# Patient Record
Sex: MALE | Race: BLACK OR AFRICAN AMERICAN | NOT HISPANIC OR LATINO | Employment: OTHER | URBAN - METROPOLITAN AREA
[De-identification: names, ages, dates, MRNs, and addresses within clinical notes are randomized per-mention and may not be internally consistent; named-entity substitution may affect disease eponyms.]

---

## 2017-01-26 LAB
EXT ALBUMIN: 4.5
EXT ALKALINE PHOSPHATASE: 83
EXT ALT: 13
EXT AST: 19
EXT BILIRUBIN TOTAL: 0.9
EXT BUN: 12
EXT CALCIUM: 9.4
EXT CHLORIDE: 101
EXT CHOLESTEROL: 257
EXT CO2: 28
EXT CREATININE: 1.33 MG/DL
EXT EOSINOPHIL%: 3
EXT GGT: 10
EXT GLUCOSE: 94
EXT HDL: 46
EXT HEMATOCRIT: 41
EXT HEMOGLOBIN: 13.9
EXT LDL CHOLESTEROL: 195.4
EXT LYMPH%: 22.8
EXT MONOCYTES%: 8
EXT PLATELETS: 169
EXT POTASSIUM: 4.2
EXT PROTEIN TOTAL: 7.9
EXT SEGS%: 65.2
EXT SODIUM: 143 MMOL/L
EXT TACROLIMUS LVL: 6.2
EXT WBC: 4.99

## 2017-01-27 ENCOUNTER — TELEPHONE (OUTPATIENT)
Dept: TRANSPLANT | Facility: CLINIC | Age: 57
End: 2017-01-27

## 2017-01-27 NOTE — TELEPHONE ENCOUNTER
----- Message from Bisi Avila MD sent at 1/26/2017  4:33 PM CST -----  The Labs are stable except lipids- to see pcp - please let patient know.

## 2017-01-27 NOTE — TELEPHONE ENCOUNTER
Labs stable  Left message advised to f/u with pcp for elevated lipids. Will repeat per protocol. Letter sent

## 2017-01-31 ENCOUNTER — TELEPHONE (OUTPATIENT)
Dept: TRANSPLANT | Facility: CLINIC | Age: 57
End: 2017-01-31

## 2017-01-31 NOTE — TELEPHONE ENCOUNTER
Please advise i have called no answer left VM stating appt had to be cancelled to due  Will schedule another appt for pt to see someone

## 2017-02-03 ENCOUNTER — TELEPHONE (OUTPATIENT)
Dept: TRANSPLANT | Facility: CLINIC | Age: 57
End: 2017-02-03

## 2017-02-03 NOTE — TELEPHONE ENCOUNTER
----- Message from Quinn Brooks sent at 2/3/2017  3:17 PM CST -----  Contact: pt   ..Neto Mixon is returning call to schedule, please call

## 2017-03-06 ENCOUNTER — OFFICE VISIT (OUTPATIENT)
Dept: TRANSPLANT | Facility: CLINIC | Age: 57
End: 2017-03-06
Payer: COMMERCIAL

## 2017-03-06 VITALS
RESPIRATION RATE: 18 BRPM | BODY MASS INDEX: 31.14 KG/M2 | WEIGHT: 255.75 LBS | DIASTOLIC BLOOD PRESSURE: 86 MMHG | OXYGEN SATURATION: 100 % | SYSTOLIC BLOOD PRESSURE: 130 MMHG | TEMPERATURE: 99 F | HEART RATE: 67 BPM | HEIGHT: 76 IN

## 2017-03-06 DIAGNOSIS — N18.30 CKD (CHRONIC KIDNEY DISEASE) STAGE 3, GFR 30-59 ML/MIN: ICD-10-CM

## 2017-03-06 DIAGNOSIS — Z29.89 PROPHYLACTIC IMMUNOTHERAPY: ICD-10-CM

## 2017-03-06 DIAGNOSIS — Z94.4 LIVER TRANSPLANTED: ICD-10-CM

## 2017-03-06 DIAGNOSIS — Z94.4 S/P LIVER TRANSPLANT: Primary | ICD-10-CM

## 2017-03-06 DIAGNOSIS — Z94.4 LIVER REPLACED BY TRANSPLANT: ICD-10-CM

## 2017-03-06 DIAGNOSIS — M85.80 OSTEOPENIA: ICD-10-CM

## 2017-03-06 PROCEDURE — 1160F RVW MEDS BY RX/DR IN RCRD: CPT | Mod: S$GLB,,, | Performed by: INTERNAL MEDICINE

## 2017-03-06 PROCEDURE — 99999 PR PBB SHADOW E&M-EST. PATIENT-LVL IV: CPT | Mod: PBBFAC,,, | Performed by: INTERNAL MEDICINE

## 2017-03-06 PROCEDURE — 3079F DIAST BP 80-89 MM HG: CPT | Mod: S$GLB,,, | Performed by: INTERNAL MEDICINE

## 2017-03-06 PROCEDURE — 3075F SYST BP GE 130 - 139MM HG: CPT | Mod: S$GLB,,, | Performed by: INTERNAL MEDICINE

## 2017-03-06 PROCEDURE — 99215 OFFICE O/P EST HI 40 MIN: CPT | Mod: S$GLB,,, | Performed by: INTERNAL MEDICINE

## 2017-03-06 RX ORDER — MYCOPHENOLATE MOFETIL 500 MG/1
500 TABLET ORAL 2 TIMES DAILY
Qty: 60 TABLET | Refills: 11 | Status: SHIPPED | OUTPATIENT
Start: 2017-03-06 | End: 2018-01-27 | Stop reason: SDUPTHER

## 2017-03-06 RX ORDER — TACROLIMUS 1 MG/1
4 CAPSULE ORAL EVERY 12 HOURS
Qty: 990 CAPSULE | Refills: 3 | Status: SHIPPED | OUTPATIENT
Start: 2017-03-06 | End: 2018-03-12 | Stop reason: SDUPTHER

## 2017-03-06 NOTE — LETTER
March 7, 2017                     [facility]  [address]  [facility] [address]  Phone: [phone]   Patient: Neto Mixon   MR Number: [id number]   YOB: 1960   Date of Visit: 3/6/2017       Dear      Thank you for referring Neto Mixon to me for evaluation. Attached you will find relevant portions of my assessment and plan of care.    If you have questions, please do not hesitate to call me. I look forward to following Neto Mixon along with you.    Sincerely,    Bisi Avila MD    Enclosure    If you would like to receive this communication electronically, please contact [email or web address] or [phone] to request [employer] access.    [employer] is a tool which provides read-only access to select patient information with whom you have a relationship. Its easy to use and provides real time access to review your patients record including encounter summaries, notes, results, and demographic information.    If you feel you have received this communication in error or would no longer like to receive these types of communications, please e-mail [email or web address]

## 2017-03-06 NOTE — PROGRESS NOTES
Transplant Hepatology  Liver Transplant Recipient Follow-up    Transplant Date: 2011  UNOS Native Liver Dx: Alcoholic Cirrhosis    Neto is here for follow up of his liver transplant.    ORGAN: LIVER  Whole or Partial: whole liver  Donor Type:  - brain death  CDC High Risk: no  Donor CMV Status: negative  Donor HCV Status: negative  Donor HBcAb: negative  Biliary Anastomosis:  Arterial Anatomy:  IVC reconstruction:  Portal vein status:    He has had the following complications since transplant: none.     Subjective:     Interval History: Neto was last seen in . He is now 5 years post liver transplant. Currently, he is doing well. Current complaints include none. Current complaints include none.    He is on Prograf monotherapy, latest level pending. He has excellent allograft function (ALT 13, AST 19). CKD, cr 1.3-1.4.    He denies any altered mental status; he has no jaundice and remainder of GI or liver-related symptoms are essentially negative. Works full time.     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 He has therefore stopped ASA and will have a repeat colonoscopy this year.    Bone density in  that showed some osteopenia. He needs to see a dermatologist annually to screen for skin cancer.    Review of Systems   Constitutional: Negative.    HENT: Negative.    Eyes: Negative.    Respiratory: Negative.    Cardiovascular: Negative.    Gastrointestinal: Negative.    Genitourinary: Negative.    Musculoskeletal: Negative.    Skin: Negative.    Neurological: Negative.    Psychiatric/Behavioral: Negative.        Objective:     Physical Exam   Constitutional: He is oriented to person, place, and time. He appears well-developed and well-nourished.   HENT:   Head: Normocephalic and atraumatic.   Eyes: Conjunctivae and EOM are normal. Pupils are equal, round, and reactive to  light. No scleral icterus.   Neck: Normal range of motion. Neck supple. No thyromegaly present.   Cardiovascular: Normal rate, regular rhythm and normal heart sounds.    Pulmonary/Chest: Effort normal and breath sounds normal. He has no rales.   Abdominal: Soft. Bowel sounds are normal. He exhibits no distension and no mass. There is no tenderness.   Musculoskeletal: Normal range of motion. He exhibits no edema.   Neurological: He is alert and oriented to person, place, and time.   Skin: Skin is warm and dry. No rash noted.   Psychiatric: He has a normal mood and affect.   Vitals reviewed.    Lab Results   Component Value Date    BILITOT 1.3 (H) 10/01/2012    AST 15 10/01/2012    ALT 11 10/01/2012    ALKPHOS 136 (H) 10/01/2012    CREATININE 1.0 10/01/2012    CREATININE 0.9 10/01/2012    ALBUMIN 3.9 10/01/2012    ALBUMIN 3.9 10/01/2012     Lab Results   Component Value Date    WBC 6.07 10/01/2012    HGB 13.3 (L) 10/01/2012    HCT 39.5 (L) 10/01/2012     10/01/2012     Lab Results   Component Value Date    TACROLIMUS 6.4 10/01/2012       Assessment/Plan:   1. Status post orthotopic liver transplantation for Laennec's cirrhosis. Current recommendations:    1. S/p liver transplant: Excellent liver function. On Prograf monotherapy. Since he has some mild CKD I recommend lowering prograf target to 3-4. I have asked him to lower his prograf dose to 4/4 from 5/5 and do monthly labs x6 to monitor for rejection. I have also recommended that cellcept be restarted to allow us to run the prograf low. The patient has expressed concern that he may not be able to afford adding a new medication. I have asked him not to lower his prograf until he is sure he can obtain the cellcept long-term.  3. Colorectal cancer screening: agree with repeat colonoscopy this year to follow up on ulcerated IC valve noted on last colonoscopy in 2016. May remain off ASA.  4. Post-transplant health maintenance: pt to see dermatologist annually to  screen for skin cancer.  5. R/O osteoporosis, hx of osteopenia in 2012: I am recommending a bone density to r/o osteoporosis this year.    RTC in one year.     Bisi Avila MD           [facility] Patient Status  Functional Status: 100% - Normal, no complaints, no evidence of disease  Physical Capacity: No Limitations

## 2017-03-06 NOTE — MR AVS SNAPSHOT
[facility]  [address]  [facility] [address]  Phone: [phone]                  Neto Mixon   3/6/2017 8:30 AM   Office Visit    Description:  Male : 1960   Provider:  Bisi Avila MD   Department:  [facility]           Reason for Visit     Liver Transplant Follow-up           Diagnoses this Visit        Comments    S/P liver transplant    -  Primary     Liver replaced by transplant                To Do List           Goals (5 Years of Data)     None       These Medications        Disp Refills Start End    tacrolimus (PROGRAF) 1 MG Cap 990 capsule 3 3/6/2017     Take 4 capsules (4 mg total) by mouth every 12 (twelve) hours. - Oral    Pharmacy: [facility] - [address] Ph #: [phone]       mycophenolate (CELLCEPT) 500 mg Tab 60 tablet 11 3/6/2017 2017    Take 1 tablet (500 mg total) by mouth 2 (two) times daily. - Oral    Pharmacy: [facility] - [address] Ph #: [phone]         [facility]     [facility] Care [facility] -  Assistance  Registered nurses in the [facility] provide clinical advisement, health education, appointment booking, and other advisory services.  Call for this free service at [phone].             Medications           Message regarding Medications     Verify the changes and/or additions to your medication regime listed below are the same as discussed with your clinician today.  If any of these changes or additions are incorrect, please notify your healthcare provider.        START taking these NEW medications        Refills    mycophenolate (CELLCEPT) 500 mg Tab 11    Sig: Take 1 tablet (500 mg total) by mouth 2 (two) times daily.    Class: Normal    Route: Oral      CHANGE how you are taking these medications     Start Taking Instead of    tacrolimus (PROGRAF) 1 MG Cap tacrolimus  "(PROGRAF) 1 MG Cap    Dosage:  Take 4 capsules (4 mg total) by mouth every 12 (twelve) hours. Dosage:  Take 5 capsules (5 mg total) by mouth every 12 (twelve) hours.    Reason for Change:  Reorder       STOP taking these medications     aspirin (ECOTRIN) 81 MG EC tablet Take 81 mg by mouth once daily.             Verify that the below list of medications is an accurate representation of the medications you are currently taking.  If none reported, the list may be blank. If incorrect, please contact your healthcare provider. Carry this list with you in case of emergency.           Current Medications     multivitamin capsule Take 1 capsule by mouth once daily.      pantoprazole (PROTONIX) 40 MG tablet Take 40 mg by mouth once daily.      sertraline (ZOLOFT) 100 MG tablet Take 100 mg by mouth once daily.      tacrolimus (PROGRAF) 1 MG Cap Take 4 capsules (4 mg total) by mouth every 12 (twelve) hours.    fish oil-omega-3 fatty acids 300-1,000 mg capsule Take 2 g by mouth 2 (two) times daily.    mycophenolate (CELLCEPT) 500 mg Tab Take 1 tablet (500 mg total) by mouth 2 (two) times daily.           Clinical Reference Information           Your Vitals Were     BP Pulse Temp Resp Height Weight    130/86 (BP Location: Right arm, Patient Position: Sitting, BP Method: Automatic) 67 99 °F (37.2 °C) (Oral) 18 6' 4" (1.93 m) 116 kg (255 lb 11.7 oz)    SpO2 BMI             100% 31.13 kg/m2         Blood Pressure          Most Recent Value    BP  130/86      Allergies as of 3/6/2017     No Known Allergies      Immunizations Administered on Date of Encounter - 3/6/2017     None      Orders Placed During Today's Visit     Future Labs/Procedures Expected by Expires    DXA Bone Density Spine And Hip  3/6/2017 3/6/2018      Maintenance Dialysis History     Patient has no recorded history of maintenance dialysis.      Transplant Information        Txp Date Organ Coordinator Care Team    7/12/2011 Liver Rosa Bashir RN Surgeon:  Stewart" KAYE Pak MD   Assisting Surgeon:  Tj Archibald Jr., MD   Current Hepatologist:  Bisi Avila MD         [facility] Sign-Up     Activating your [facility] account is as easy as 1-2-3!     1) Visit [email or web address], select Sign Up Now, enter this activation code and your date of birth, then select Next.  [email or web address]  Expires: 4/20/2017  8:57 AM      2) Create a username and password to use when you visit [facility] in the future and select a security question in case you lose your password and select Next.    3) Enter your e-mail address and click Sign Up!    Additional Information  If you have questions, please e-mail [email or web address] or call [phone] to talk to our [facility] staff. Remember, [facility] is NOT to be used for urgent needs. For medical emergencies, dial 911.         Instructions    1. Lower the prograf to minimize the toxicity to the kidneys; new target trough of 3-4. Decrease prograf to 4 mg twice daily  2. Add back cellcept at 500 mg bid. Check labs monthly x 3  3. Repeat colonoscopy this year to follow up on ulcer seen on IC valve  4. Bone density  5. Dermatology evaluation annually to screen for skin cancer       Language Assistance Services     ATTENTION: Language assistance services are available, free of charge. Please call [phone].      ATENCIÓN: Si habla español, tiene a greco disposición servicios gratuitos de asistencia lingüística. Llame al [phone].     CHÚ Ý: N?u b?n nói Ti?ng Vi?t, có các d?ch v? h? tr? ngôn ng? mi?n phí dành cho b?n. G?i s? [phone].         [facility] complies with applicable Federal civil rights laws and does not discriminate on the basis of race, color, national origin, age, disability, or sex.

## 2017-03-06 NOTE — PATIENT INSTRUCTIONS
1. Lower the prograf to minimize the toxicity to the kidneys; new target trough of 3-4. Decrease prograf to 4 mg twice daily  2. Add back cellcept at 500 mg bid. Check labs monthly x 3  3. Repeat colonoscopy this year to follow up on ulcer seen on IC valve  4. Bone density  5. Dermatology evaluation annually to screen for skin cancer

## 2017-03-07 PROBLEM — M85.80 OSTEOPENIA: Status: ACTIVE | Noted: 2017-03-07

## 2017-03-07 PROBLEM — Z29.89 PROPHYLACTIC IMMUNOTHERAPY: Status: ACTIVE | Noted: 2017-03-07

## 2017-03-15 ENCOUNTER — TELEPHONE (OUTPATIENT)
Dept: TRANSPLANT | Facility: CLINIC | Age: 57
End: 2017-03-15

## 2017-03-15 NOTE — TELEPHONE ENCOUNTER
----- Message from Rosa Bashir RN sent at 3/7/2017 12:37 PM CST -----      ----- Message -----     From: Bisi Avila MD     Sent: 3/7/2017   7:36 AM       To: [facility]-Liver Transplant Clinical    Note changes in IS

## 2017-04-10 LAB
EXT ALBUMIN: 4.2
EXT ALKALINE PHOSPHATASE: 73
EXT ALT: 9
EXT AST: 18
EXT BASOPHIL%: 0.8
EXT BILIRUBIN TOTAL: 0.9
EXT BUN: 12
EXT CALCIUM: 9.1
EXT CHLORIDE: 101
EXT CHOLESTEROL: 232
EXT CO2: 26
EXT CREATININE: 1.34 MG/DL
EXT EOSINOPHIL%: 2.8
EXT GGT: 9
EXT GLUCOSE: 93
EXT HDL: 45
EXT HEMATOCRIT: 41.1
EXT HEMOGLOBIN: 13.6
EXT LDL CHOLESTEROL: 172.4
EXT LYMPH%: 24.8
EXT MONOCYTES%: 8.6
EXT PLATELETS: 166
EXT POTASSIUM: 4.2
EXT PROTEIN TOTAL: 7.4
EXT SEGS%: 62.6
EXT SODIUM: 141 MMOL/L
EXT TACROLIMUS LVL: 5.5
EXT TRIGLYCERIDES: 73
EXT WBC: 5.32

## 2017-04-13 ENCOUNTER — TELEPHONE (OUTPATIENT)
Dept: TRANSPLANT | Facility: CLINIC | Age: 57
End: 2017-04-13

## 2017-04-13 NOTE — TELEPHONE ENCOUNTER
----- Message from Bisi Avila MD sent at 4/13/2017  6:25 AM CDT -----  The Labs are stable - please let patient know.

## 2017-07-14 LAB
EXT ALBUMIN: 4
EXT ALKALINE PHOSPHATASE: 66
EXT ALT: 9
EXT AST: 15
EXT BASOPHIL%: 0.4
EXT BILIRUBIN TOTAL: 0.8
EXT BUN: 14
EXT CALCIUM: 9.2
EXT CHLORIDE: 105
EXT CHOLESTEROL: 214
EXT CO2: 27
EXT CREATININE: 1.43 MG/DL
EXT EOSINOPHIL%: 2.2
EXT GFR MDRD AF AMER: 51.16
EXT GGT: 9
EXT GLUCOSE: 96
EXT HDL: 41
EXT HEMATOCRIT: 39.9
EXT HEMOGLOBIN: 13.6
EXT LDL CHOLESTEROL: 160.4
EXT LYMPH%: 19.4
EXT MONOCYTES%: 8.2
EXT PLATELETS: 158
EXT POTASSIUM: 4.5
EXT PROTEIN TOTAL: 7
EXT SEGS%: 69.6
EXT SODIUM: 143 MMOL/L
EXT TACROLIMUS LVL: 6.5
EXT TRIGLYCERIDES: 63
EXT WBC: 4.65

## 2017-07-18 ENCOUNTER — TELEPHONE (OUTPATIENT)
Dept: TRANSPLANT | Facility: CLINIC | Age: 57
End: 2017-07-18

## 2017-07-18 NOTE — TELEPHONE ENCOUNTER
----- Message from Bisi Avila MD sent at 7/16/2017  2:59 PM CDT -----  The Labs are stable - please let patient know.

## 2017-10-27 LAB
EXT ALBUMIN: 4.2
EXT ALKALINE PHOSPHATASE: 75
EXT ALT: 8
EXT AST: 15
EXT BASOPHIL%: 1.2
EXT BILIRUBIN TOTAL: 1
EXT BUN: 14
EXT CALCIUM: 9.1
EXT CHLORIDE: 103
EXT CHOLESTEROL: 212
EXT CO2: 28
EXT CREATININE: 1.41 MG/DL
EXT EOSINOPHIL%: 2.7
EXT GFR MDRD AF AMER: 51.99
EXT GGT: 8
EXT GLUCOSE: 92
EXT HEMATOCRIT: 40.9
EXT HEMOGLOBIN: 13.7
EXT LYMPH%: 20.8
EXT MONOCYTES%: 8.7
EXT PLATELETS: 153
EXT POTASSIUM: 4.6
EXT PROTEIN TOTAL: 7.3
EXT SEGS%: 66.4
EXT SODIUM: 144 MMOL/L
EXT TACROLIMUS LVL: 5.3
EXT TRIGLYCERIDES: 65
EXT WBC: 4.85

## 2017-10-30 ENCOUNTER — TELEPHONE (OUTPATIENT)
Dept: TRANSPLANT | Facility: CLINIC | Age: 57
End: 2017-10-30

## 2017-10-30 NOTE — TELEPHONE ENCOUNTER
----- Message from Bisi Avila MD sent at 10/27/2017  5:55 PM CDT -----  The Labs are stable - please let patient know.

## 2018-01-27 DIAGNOSIS — Z94.4 S/P LIVER TRANSPLANT: ICD-10-CM

## 2018-01-28 RX ORDER — MYCOPHENOLATE MOFETIL 500 MG/1
TABLET ORAL
Qty: 60 TABLET | Refills: 11 | Status: SHIPPED | OUTPATIENT
Start: 2018-01-28 | End: 2018-10-02 | Stop reason: SDUPTHER

## 2018-02-05 ENCOUNTER — TELEPHONE (OUTPATIENT)
Dept: TRANSPLANT | Facility: CLINIC | Age: 58
End: 2018-02-05

## 2018-02-05 NOTE — TELEPHONE ENCOUNTER
----- Message from Soheila Sen sent at 2/5/2018  3:34 PM CST -----  Contact: Self  Mr. Mixon called to speak with Rosa, if possible, regarding a Prior [facility] Rx for mycophenolate (CELLCEPT) 500 mg Tab.    He also wanted to discuss his lab results from today.    Please advise.    Mr. Mixon can be reached at [phone]) regarding this message.    Thanks.

## 2018-02-09 LAB
EXT ALBUMIN: 4.1
EXT ALKALINE PHOSPHATASE: 73
EXT ALT: 10
EXT AST: 18
EXT BASOPHIL%: 0.8
EXT BILIRUBIN TOTAL: 0.9
EXT BUN: 13
EXT CALCIUM: 9
EXT CHLORIDE: 104
EXT CO2: 29
EXT CREATININE: 1.33 MG/DL
EXT EOSINOPHIL%: 2.9
EXT GFR MDRD AF AMER: 55.62
EXT GGT: 9
EXT GLUCOSE: 92
EXT HEMATOCRIT: 42.5
EXT HEMOGLOBIN: 13.9
EXT LYMPH%: 21
EXT MONOCYTES%: 8
EXT PLATELETS: 178
EXT POTASSIUM: 4.6
EXT PROTEIN TOTAL: 7.4
EXT SEGS%: 67.1
EXT SODIUM: 143 MMOL/L
EXT TACROLIMUS LVL: 5.1
EXT WBC: 4.9

## 2018-02-15 ENCOUNTER — TELEPHONE (OUTPATIENT)
Dept: TRANSPLANT | Facility: CLINIC | Age: 58
End: 2018-02-15

## 2018-02-15 NOTE — TELEPHONE ENCOUNTER
----- Message from Bisi Avila MD sent at 2/9/2018  9:01 PM CST -----  The Labs are stable - please let patient know.

## 2018-02-20 ENCOUNTER — TELEPHONE (OUTPATIENT)
Dept: TRANSPLANT | Facility: CLINIC | Age: 58
End: 2018-02-20

## 2018-02-20 DIAGNOSIS — Z94.4 STATUS POST LIVER TRANSPLANT: Primary | ICD-10-CM

## 2018-02-20 NOTE — TELEPHONE ENCOUNTER
----- Message from Va Bryan sent at 2/20/2018  3:54 PM CST -----  Contact: pt  Pt calling to let coordinator know that he did have bone density test done already.    Pt contact number [phone]

## 2018-03-12 ENCOUNTER — HOSPITAL ENCOUNTER (OUTPATIENT)
Dept: RADIOLOGY | Facility: HOSPITAL | Age: 58
Discharge: HOME | End: 2018-03-12
Attending: INTERNAL MEDICINE
Payer: COMMERCIAL

## 2018-03-12 ENCOUNTER — OFFICE VISIT (OUTPATIENT)
Dept: TRANSPLANT | Facility: CLINIC | Age: 58
End: 2018-03-12
Payer: COMMERCIAL

## 2018-03-12 VITALS
HEART RATE: 66 BPM | SYSTOLIC BLOOD PRESSURE: 135 MMHG | WEIGHT: 255.31 LBS | BODY MASS INDEX: 31.09 KG/M2 | TEMPERATURE: 99 F | OXYGEN SATURATION: 100 % | HEIGHT: 76 IN | DIASTOLIC BLOOD PRESSURE: 89 MMHG | RESPIRATION RATE: 16 BRPM

## 2018-03-12 DIAGNOSIS — Z94.4 LIVER REPLACED BY TRANSPLANT: ICD-10-CM

## 2018-03-12 DIAGNOSIS — Z29.89 PROPHYLACTIC IMMUNOTHERAPY: Primary | ICD-10-CM

## 2018-03-12 DIAGNOSIS — Z94.4 STATUS POST LIVER TRANSPLANT: ICD-10-CM

## 2018-03-12 DIAGNOSIS — Z94.4 LIVER TRANSPLANTED: ICD-10-CM

## 2018-03-12 PROCEDURE — 3075F SYST BP GE 130 - 139MM HG: CPT | Mod: CPTII,S$GLB,, | Performed by: INTERNAL MEDICINE

## 2018-03-12 PROCEDURE — 76705 ECHO EXAM OF ABDOMEN: CPT | Mod: 26,59,, | Performed by: INTERNAL MEDICINE

## 2018-03-12 PROCEDURE — 93975 VASCULAR STUDY: CPT | Mod: 26,,, | Performed by: INTERNAL MEDICINE

## 2018-03-12 PROCEDURE — 3079F DIAST BP 80-89 MM HG: CPT | Mod: CPTII,S$GLB,, | Performed by: INTERNAL MEDICINE

## 2018-03-12 PROCEDURE — 99999 PR PBB SHADOW E&M-EST. PATIENT-LVL IV: CPT | Mod: PBBFAC,,, | Performed by: INTERNAL MEDICINE

## 2018-03-12 PROCEDURE — 99215 OFFICE O/P EST HI 40 MIN: CPT | Mod: S$GLB,,, | Performed by: INTERNAL MEDICINE

## 2018-03-12 PROCEDURE — 93975 VASCULAR STUDY: CPT | Mod: TC

## 2018-03-12 RX ORDER — TACROLIMUS 1 MG/1
CAPSULE ORAL
Qty: 990 CAPSULE | Refills: 3 | Status: SHIPPED | OUTPATIENT
Start: 2018-03-12 | End: 2018-03-19 | Stop reason: SDUPTHER

## 2018-03-12 NOTE — PROGRESS NOTES
Transplant Hepatology  Liver Transplant Recipient Follow-up    Transplant Date: 2011  UNOS Native Liver Dx: Alcoholic Cirrhosis    Neto is here for follow up of his liver transplant.    ORGAN: LIVER  Whole or Partial: whole liver  Donor Type:  - brain death  CDC High Risk: no  Donor CMV Status: negative  Donor HCV Status: negative  Donor HBcAb: negative  Biliary Anastomosis:  Arterial Anatomy:  IVC reconstruction:  Portal vein status:    He has had the following complications since transplant: none.     Subjective:     Interval History: Neto was last seen in . He is now 6 years post liver transplant. Currently, he is doing well. Current complaints include none.     He is on Prograf monotherapy, latest level pending. He has excellent allograft function (ALT 10, AST 18). CKD, cr 1.3-1.4. PG level 5.1.    He denies any altered mental status; he has no jaundice and remainder of GI or liver-related symptoms are essentially negative. Works full time.     He did have a colonoscopy in 2017 that did not reveal any polyps.  Bone density in  that showed some osteopenia-repeat now. He needs to see a dermatologist annually to screen for skin cancer.    US doppler ok today.     Review of Systems   Constitutional: Negative.    HENT: Negative.    Eyes: Negative.    Respiratory: Negative.    Cardiovascular: Negative.    Gastrointestinal: Negative.    Genitourinary: Negative.    Musculoskeletal: Negative.    Skin: Negative.    Neurological: Negative.    Psychiatric/Behavioral: Negative.        Objective:     Physical Exam   Constitutional: He is oriented to person, place, and time. He appears well-developed and well-nourished.   HENT:   Head: Normocephalic and atraumatic.   Eyes: Conjunctivae and EOM are normal. Pupils are equal, round, and reactive to light. No scleral icterus.   Neck: Normal range of motion. Neck supple. No thyromegaly present.   Cardiovascular: Normal rate, regular rhythm and normal  heart sounds.    Pulmonary/Chest: Effort normal and breath sounds normal. He has no rales.   Abdominal: Soft. Bowel sounds are normal. He exhibits no distension and no mass. There is no tenderness.   Musculoskeletal: Normal range of motion. He exhibits no edema.   Neurological: He is alert and oriented to person, place, and time.   Skin: Skin is warm and dry. No rash noted.   Psychiatric: He has a normal mood and affect.   Vitals reviewed.    Lab Results   Component Value Date    BILITOT 1.3 (H) 10/01/2012    AST 15 10/01/2012    ALT 11 10/01/2012    ALKPHOS 136 (H) 10/01/2012    CREATININE 1.0 10/01/2012    CREATININE 0.9 10/01/2012    ALBUMIN 3.9 10/01/2012    ALBUMIN 3.9 10/01/2012     Lab Results   Component Value Date    WBC 6.07 10/01/2012    HGB 13.3 (L) 10/01/2012    HCT 39.5 (L) 10/01/2012     10/01/2012     Lab Results   Component Value Date    TACROLIMUS 6.4 10/01/2012       Assessment/Plan:   1. Status post orthotopic liver transplantation for Laennec's cirrhosis. Current recommendations:    1. S/p liver transplant: Excellent liver function. On Prograf monotherapy. Since he has some mild CKD I recommend lowering prograf target to 3-4. I have asked him to lower his prograf dose to 4/3 from 4/4 and do monthly labs every 2 months instead of every 3 months.  3. Colorectal cancer screening: repeat in 2027  4. Post-transplant health maintenance: pt to see dermatologist annually to screen for skin cancer.  5. R/O osteoporosis, hx of osteopenia in 2012: I am recommending a bone density to r/o osteoporosis this year. Start calcium and vit d    RTC in one year.     Bisi Avila MD           [facility] Patient Status  Functional Status: 100% - Normal, no complaints, no evidence of disease  Physical Capacity: No Limitations.

## 2018-03-12 NOTE — PATIENT INSTRUCTIONS
1. Lower prograf to 4 mg in the morning and 3 mg in the afternoon- goal is a trough of 3-4. Check liver enzymes every 2 months instead of every 3 months  2. Annual dermatology  3. Repeat  2022 (had colonoscopy 9/11/17)  4. Bone density this year; start calcium 1000 mg daily and vitamin D 1000 units daily  Return one year

## 2018-03-12 NOTE — LETTER
March 18, 2018                     [facility]  [address]  [facility] [address]  Phone: [phone]   Patient: Neto Mixon   MR Number: [id number]   YOB: 1960   Date of Visit: 3/12/2018       Dear      Thank you for referring Neto Mixno to me for evaluation. Attached you will find relevant portions of my assessment and plan of care.    If you have questions, please do not hesitate to call me. I look forward to following Neto Mixon along with you.    Sincerely,    Bisi Avila MD    Enclosure    If you would like to receive this communication electronically, please contact [email or web address] or [phone] to request [employer] [facility] access.    [employer] [facility] is a tool which provides read-only access to select patient information with whom you have a relationship. Its easy to use and provides real time access to review your patients record including encounter summaries, notes, results, and demographic information.    If you feel you have received this communication in error or would no longer like to receive these types of communications, please e-mail [email or web address]

## 2018-03-16 ENCOUNTER — TELEPHONE (OUTPATIENT)
Dept: TRANSPLANT | Facility: CLINIC | Age: 58
End: 2018-03-16

## 2018-03-16 NOTE — TELEPHONE ENCOUNTER
----- Message from Bisi Avila MD sent at 3/12/2018  9:48 AM CDT -----  1. Lower prograf to 4 mg in the morning and 3 mg in the afternoon- goal is a trough of 3-4. Check liver enzymes every 2 months instead of every 3 months  2. Annual dermatology  3. Repeat  2022 (had colonoscopy 9/11/17)  4. Bone density this year; start calcium 1000 mg daily and vitamin D 1000 units daily  Return one year

## 2018-03-16 NOTE — TELEPHONE ENCOUNTER
----- Message from Bisi Avila MD sent at 3/16/2018 11:00 AM CDT -----  US stable - please let patient know.

## 2018-03-19 DIAGNOSIS — Z94.4 LIVER REPLACED BY TRANSPLANT: ICD-10-CM

## 2018-03-19 RX ORDER — TACROLIMUS 1 MG/1
CAPSULE ORAL
Qty: 630 CAPSULE | Refills: 3 | Status: SHIPPED | OUTPATIENT
Start: 2018-03-19 | End: 2018-10-02 | Stop reason: SDUPTHER

## 2018-03-19 NOTE — TELEPHONE ENCOUNTER
----- Message from Va Bryan sent at 3/19/2018 11:39 AM CDT -----  Contact: [facility]  Calling regarding getting clarification on tacrolimus (PROGRAF) 1 MG Cap   Qty was submitted @990 with 3 refills/should be 630 qty    [facility] [phone] opt 1 then opt 6   ref #[id number]    Fax [phone]

## 2018-05-15 LAB
EXT ALBUMIN: 4.2
EXT ALKALINE PHOSPHATASE: 74
EXT ALT: 9
EXT AST: 15
EXT BASOPHIL%: 0.8
EXT BILIRUBIN TOTAL: 0.9
EXT BUN: 16
EXT CALCIUM: 9.4
EXT CHLORIDE: 104
EXT CO2: 30
EXT CREATININE: 1.44 MG/DL
EXT EOSINOPHIL%: 2
EXT GFR MDRD AF AMER: 50.56
EXT GGT: 8
EXT GLUCOSE: 96
EXT HEMATOCRIT: 40.4
EXT HEMOGLOBIN: 13.2
EXT LYMPH%: 18.8
EXT MONOCYTES%: 9.1
EXT PLATELETS: 150
EXT POTASSIUM: 4.8
EXT PROTEIN TOTAL: 7.1
EXT SEGS%: 69.1
EXT SODIUM: 143 MMOL/L
EXT TACROLIMUS LVL: 4.7
EXT WBC: 5.06

## 2018-05-21 ENCOUNTER — TELEPHONE (OUTPATIENT)
Dept: TRANSPLANT | Facility: CLINIC | Age: 58
End: 2018-05-21

## 2018-05-21 NOTE — TELEPHONE ENCOUNTER
----- Message from Bisi Avila MD sent at 5/20/2018 10:28 PM CDT -----  The Labs are stable - please let patient know.

## 2018-10-02 DIAGNOSIS — Z94.4 S/P LIVER TRANSPLANT: ICD-10-CM

## 2018-10-02 DIAGNOSIS — Z94.4 LIVER REPLACED BY TRANSPLANT: ICD-10-CM

## 2018-10-03 RX ORDER — MYCOPHENOLATE MOFETIL 250 MG/1
500 CAPSULE ORAL 2 TIMES DAILY
Qty: 360 CAPSULE | Refills: 3 | Status: SHIPPED | OUTPATIENT
Start: 2018-10-03 | End: 2019-02-21 | Stop reason: SDUPTHER

## 2018-10-03 RX ORDER — TACROLIMUS 1 MG/1
CAPSULE ORAL
Qty: 630 CAPSULE | Refills: 3 | Status: SHIPPED | OUTPATIENT
Start: 2018-10-03 | End: 2019-02-21 | Stop reason: SDUPTHER

## 2018-10-05 ENCOUNTER — TELEPHONE (OUTPATIENT)
Dept: TRANSPLANT | Facility: CLINIC | Age: 58
End: 2018-10-05

## 2018-10-05 NOTE — TELEPHONE ENCOUNTER
Pt lab states pt does not have labs. But pt states he went had labs drawn. Pt states he will go to get them re drawn. Fatemeh been calling since august to get these labs.

## 2018-10-08 LAB
EXT ALBUMIN: 4.3
EXT ALKALINE PHOSPHATASE: 68
EXT ALT: 8
EXT AST: 15
EXT BASOPHIL%: 0.7
EXT BILIRUBIN TOTAL: 0.7
EXT BUN: 19
EXT CALCIUM: 9.5
EXT CHLORIDE: 102
EXT CHOLESTEROL: 211
EXT CO2: 27
EXT CREATININE: 1.49 MG/DL
EXT EOSINOPHIL%: 2.2
EXT GFR MDRD AF AMER: 48.61
EXT GGT: 10
EXT GLUCOSE: 103
EXT HDL: 43
EXT HEMATOCRIT: 40.6
EXT HEMOGLOBIN: 13.3
EXT LDL CHOLESTEROL: 153.8
EXT LYMPH%: 17.3
EXT MONOCYTES%: 8.3
EXT PLATELETS: 167
EXT POTASSIUM: 4.4
EXT PROTEIN TOTAL: 7.2
EXT SEGS%: 71.2
EXT SODIUM: 141 MMOL/L
EXT TACROLIMUS LVL: 5.8
EXT TRIGLYCERIDES: 71
EXT WBC: 5.89

## 2018-10-09 ENCOUNTER — TELEPHONE (OUTPATIENT)
Dept: TRANSPLANT | Facility: CLINIC | Age: 58
End: 2018-10-09

## 2018-10-09 NOTE — TELEPHONE ENCOUNTER
----- Message from Bisi Avila MD sent at 10/8/2018  4:48 PM CDT -----  The Labs are stable - please let patient know.

## 2018-10-09 NOTE — TELEPHONE ENCOUNTER
----- Message from Kamla Jarrod sent at 10/9/2018  3:06 PM CDT -----  Contact: Patient  Needs Advice    Reason for call:  He wanted to verify if his medication were called in.        Communication Preference: [phone]    Additional Information: n/a

## 2018-10-09 NOTE — TELEPHONE ENCOUNTER
----- Message from Angelo Hogan sent at 10/9/2018  3:32 PM CDT -----  Contact: Patient  Patient Returning Call from [facility]    Who Left Message for Patient: Rosa WALLS    Communication Preference: [phone]    Additional Information:

## 2018-12-07 LAB
EXT ALBUMIN: 4.1
EXT ALKALINE PHOSPHATASE: 77
EXT ALT: 10
EXT AST: 17
EXT BASOPHIL%: 0.8
EXT BILIRUBIN TOTAL: 0.9
EXT BUN: 20
EXT CALCIUM: 9.3
EXT CHLORIDE: 104
EXT CHOLESTEROL: 211
EXT CO2: 28
EXT CREATININE: 1.38 MG/DL
EXT EOSINOPHIL%: 3.5
EXT GGT: 8
EXT GLUCOSE: 96
EXT HDL: 42
EXT HEMATOCRIT: 39.5
EXT HEMOGLOBIN: 12.9
EXT LDL CHOLESTEROL: 155.2
EXT LYMPH%: 24.9
EXT MONOCYTES%: 9
EXT PLATELETS: 173
EXT POTASSIUM: 4.5
EXT PROTEIN TOTAL: 7.1
EXT SEGS%: 61.6
EXT SODIUM: 143 MMOL/L
EXT TACROLIMUS LVL: 4.9
EXT TRIGLYCERIDES: 69
EXT WBC: 5.11

## 2018-12-10 ENCOUNTER — TELEPHONE (OUTPATIENT)
Dept: TRANSPLANT | Facility: CLINIC | Age: 58
End: 2018-12-10

## 2018-12-10 NOTE — TELEPHONE ENCOUNTER
----- Message from Jaspal Worthington sent at 12/10/2018 10:43 AM CST -----  Contact: patient  Please call pt at [phone]     Patient did all labs on Friday 12/07/18. Do you have the results?    Thank you

## 2018-12-10 NOTE — TELEPHONE ENCOUNTER
----- Message from Bisi Avila MD sent at 12/10/2018 12:41 PM CST -----  The Labs are stable - please let patient know.

## 2019-02-21 DIAGNOSIS — Z94.4 LIVER REPLACED BY TRANSPLANT: ICD-10-CM

## 2019-02-21 RX ORDER — TACROLIMUS 1 MG/1
CAPSULE ORAL
Qty: 630 CAPSULE | Refills: 3 | Status: SHIPPED | OUTPATIENT
Start: 2019-02-21 | End: 2020-01-07

## 2019-02-21 RX ORDER — MYCOPHENOLATE MOFETIL 250 MG/1
500 CAPSULE ORAL 2 TIMES DAILY
Qty: 360 CAPSULE | Refills: 3 | Status: SHIPPED | OUTPATIENT
Start: 2019-02-21 | End: 2020-01-08

## 2019-02-21 NOTE — TELEPHONE ENCOUNTER
----- Message from Kamla Garay sent at 2/21/2019  8:07 AM CST -----  Contact: Patient  Needs Advice    Reason for call: Patient stated [facility] needs a 90-day Rx for his prograf and his CellCept.  He will run out of medication this week.   [phone] at [address] [place] [address]        Communication Preference: [phone]    Additional Information: n/a

## 2019-04-13 LAB
EXT ALBUMIN: 4.3
EXT ALKALINE PHOSPHATASE: 76
EXT ALT: 13
EXT AST: 18
EXT BASOPHIL%: 0.9
EXT BILIRUBIN TOTAL: 1.08
EXT BUN: 15.1
EXT CALCIUM: 9.9
EXT CHLORIDE: 103
EXT CO2: 29
EXT CREATININE: 1.46 MG/DL
EXT EOSINOPHIL%: 2
EXT GGT: 9
EXT GLUCOSE: 91
EXT HEMATOCRIT: 43.2
EXT HEMOGLOBIN: 13.8
EXT LYMPH%: 21.4
EXT MONOCYTES%: 8.7
EXT PLATELETS: 162
EXT POTASSIUM: 4.5
EXT PROTEIN TOTAL: 7.5
EXT SEGS%: 66.8
EXT SODIUM: 143 MMOL/L
EXT TACROLIMUS LVL: 4.9
EXT WBC: 4.5

## 2019-04-24 ENCOUNTER — TELEPHONE (OUTPATIENT)
Dept: TRANSPLANT | Facility: CLINIC | Age: 59
End: 2019-04-24

## 2019-04-24 NOTE — TELEPHONE ENCOUNTER
----- Message from Bisi Avila MD sent at 4/19/2019  6:15 PM CDT -----  The Labs are stable - please let patient know.

## 2019-05-02 ENCOUNTER — TELEPHONE (OUTPATIENT)
Dept: TRANSPLANT | Facility: CLINIC | Age: 59
End: 2019-05-02

## 2019-05-02 NOTE — TELEPHONE ENCOUNTER
Pt reports unable to make it here right now due to changing jobs. Pt also concerned about seeing Dr. Avila  At new location. Pt agreed to try this time  Will schedule for July

## 2019-05-02 NOTE — TELEPHONE ENCOUNTER
----- Message from Domitila Blakely MA sent at 5/2/2019  4:23 PM CDT -----  Contact: Patient   Pt wants to change doctors     ----- Message -----  From: Angelo Hogan  Sent: 5/2/2019   8:33 AM  To: [facility] Post-Liver Transplant Non-Clinical    Needs Advice    Reason for call: Calling to cancel appt         Communication Preference: [phone]     Additional Information: N/A

## 2019-07-01 ENCOUNTER — TELEPHONE (OUTPATIENT)
Dept: TRANSPLANT | Facility: CLINIC | Age: 59
End: 2019-07-01

## 2019-07-01 NOTE — TELEPHONE ENCOUNTER
----- Message from Tanvi Mckee sent at 7/1/2019  3:23 PM CDT -----    Called and sp to pt about new location for Dr. Margarita nielsen. Will mail out new appt slip.Will also send driving directions.

## 2019-07-23 LAB
EXT ALBUMIN: 4.3
EXT ALKALINE PHOSPHATASE: 70
EXT ALT: 12
EXT AST: 21
EXT BASOPHIL%: 0.9
EXT BILIRUBIN TOTAL: 0.95
EXT BUN: 11.3
EXT CALCIUM: 9.4
EXT CHLORIDE: 108
EXT CO2: 30
EXT CREATININE: 1.43 MG/DL
EXT EOSINOPHIL%: 2.5
EXT GFR MDRD AF AMER: 50.79
EXT GFR MDRD NON AF AMER: 50.79
EXT GGT: 8
EXT GLUCOSE: 92
EXT HEMATOCRIT: 40.8
EXT HEMOGLOBIN: 13.1
EXT LYMPH%: 24.5
EXT MONOCYTES%: 9.8
EXT PLATELETS: 167
EXT POTASSIUM: 4.3
EXT PROTEIN TOTAL: 7
EXT SEGS%: 62.1
EXT SODIUM: 148 MMOL/L
EXT TACROLIMUS LVL: 3.9
EXT WBC: 4.4

## 2019-07-25 ENCOUNTER — TELEPHONE (OUTPATIENT)
Dept: TRANSPLANT | Facility: CLINIC | Age: 59
End: 2019-07-25

## 2019-07-29 ENCOUNTER — OFFICE VISIT (OUTPATIENT)
Dept: TRANSPLANT | Facility: CLINIC | Age: 59
End: 2019-07-29
Attending: INTERNAL MEDICINE
Payer: COMMERCIAL

## 2019-07-29 VITALS
DIASTOLIC BLOOD PRESSURE: 87 MMHG | OXYGEN SATURATION: 100 % | RESPIRATION RATE: 17 BRPM | BODY MASS INDEX: 31.41 KG/M2 | HEART RATE: 62 BPM | HEIGHT: 76 IN | TEMPERATURE: 99 F | WEIGHT: 257.94 LBS | SYSTOLIC BLOOD PRESSURE: 164 MMHG

## 2019-07-29 DIAGNOSIS — Z29.89 PROPHYLACTIC IMMUNOTHERAPY: ICD-10-CM

## 2019-07-29 DIAGNOSIS — Z94.4 LIVER TRANSPLANTED: Primary | ICD-10-CM

## 2019-07-29 PROCEDURE — 99999 PR PBB SHADOW E&M-EST. PATIENT-LVL III: CPT | Mod: PBBFAC,,, | Performed by: INTERNAL MEDICINE

## 2019-07-29 PROCEDURE — 99215 OFFICE O/P EST HI 40 MIN: CPT | Mod: S$GLB,,, | Performed by: INTERNAL MEDICINE

## 2019-07-29 PROCEDURE — 3077F PR MOST RECENT SYSTOLIC BLOOD PRESSURE >= 140 MM HG: ICD-10-PCS | Mod: CPTII,S$GLB,, | Performed by: INTERNAL MEDICINE

## 2019-07-29 PROCEDURE — 3077F SYST BP >= 140 MM HG: CPT | Mod: CPTII,S$GLB,, | Performed by: INTERNAL MEDICINE

## 2019-07-29 PROCEDURE — 99215 PR OFFICE/OUTPT VISIT, EST, LEVL V, 40-54 MIN: ICD-10-PCS | Mod: S$GLB,,, | Performed by: INTERNAL MEDICINE

## 2019-07-29 PROCEDURE — 3079F DIAST BP 80-89 MM HG: CPT | Mod: CPTII,S$GLB,, | Performed by: INTERNAL MEDICINE

## 2019-07-29 PROCEDURE — 3079F PR MOST RECENT DIASTOLIC BLOOD PRESSURE 80-89 MM HG: ICD-10-PCS | Mod: CPTII,S$GLB,, | Performed by: INTERNAL MEDICINE

## 2019-07-29 PROCEDURE — 3008F PR BODY MASS INDEX (BMI) DOCUMENTED: ICD-10-PCS | Mod: CPTII,S$GLB,, | Performed by: INTERNAL MEDICINE

## 2019-07-29 PROCEDURE — 3008F BODY MASS INDEX DOCD: CPT | Mod: CPTII,S$GLB,, | Performed by: INTERNAL MEDICINE

## 2019-07-29 PROCEDURE — 99999 PR PBB SHADOW E&M-EST. PATIENT-LVL III: ICD-10-PCS | Mod: PBBFAC,,, | Performed by: INTERNAL MEDICINE

## 2019-07-29 NOTE — Clinical Note
1. Repeat colonoscopy 20272. See dermatologist now and annually3. I will have Rosa check your bone density- continue calcium supplement with vit DReturn 1 year

## 2019-07-29 NOTE — PROGRESS NOTES
Transplant Hepatology  Liver Transplant Recipient Follow-up    Transplant Date: 2011  UNOS Native Liver Dx: Alcoholic Cirrhosis    Neto is here for follow up of his liver transplant.    ORGAN: LIVER  Whole or Partial: whole liver  Donor Type:  - brain death  CDC High Risk: no  Donor CMV Status: negative  Donor HCV Status: negative  Donor HBcAb: negative  Biliary Anastomosis:  Arterial Anatomy:  IVC reconstruction:  Portal vein status:    He has had the following complications since transplant: none.     Subjective:     Interval History: Neto is now 8 years post liver transplant. Currently, he is doing well. Current complaints include none.     He is on Prograf with a target trough of 3-4 and also on cellcept 500 mg bid. He has excellent allograft function 19: ALT 12, AST 21. CKD, cr 1.43. PG level 3.9.    He denies any altered mental status; he has no jaundice and remainder of GI or liver-related symptoms are essentially negative. Works full time.     He did have a colonoscopy in  that did not reveal any polyps.  Bone density in  that showed some osteopenia. He thinks he may have had another one since his last visit one year ago, but is not sure. He takes a calcium supplement. He needs to see a dermatologist annually to screen for skin cancer. He has not yet done this.    US doppler 3/12/18: satisfactory.     Review of Systems   Constitutional: Negative.    HENT: Negative.    Eyes: Negative.    Respiratory: Negative.    Cardiovascular: Negative.    Gastrointestinal: Negative.    Genitourinary: Negative.    Musculoskeletal: Negative.    Skin: Negative.    Neurological: Negative.    Psychiatric/Behavioral: Negative.        Objective:     Physical Exam   Constitutional: He is oriented to person, place, and time. He appears well-developed and well-nourished.   HENT:   Head: Normocephalic and atraumatic.   Eyes: Pupils are equal, round, and reactive to light. Conjunctivae and EOM are  normal. No scleral icterus.   Neck: Normal range of motion. Neck supple. No thyromegaly present.   Cardiovascular: Normal rate, regular rhythm and normal heart sounds.   Pulmonary/Chest: Effort normal and breath sounds normal. He has no rales.   Abdominal: Soft. Bowel sounds are normal. He exhibits no distension and no mass. There is no tenderness.   Musculoskeletal: Normal range of motion. He exhibits no edema.   Neurological: He is alert and oriented to person, place, and time.   Skin: Skin is warm and dry. No rash noted.   Psychiatric: He has a normal mood and affect.   Vitals reviewed.    Lab Results   Component Value Date    BILITOT 1.3 (H) 10/01/2012    AST 15 10/01/2012    ALT 11 10/01/2012    ALKPHOS 136 (H) 10/01/2012    CREATININE 1.0 10/01/2012    CREATININE 0.9 10/01/2012    ALBUMIN 3.9 10/01/2012    ALBUMIN 3.9 10/01/2012     Lab Results   Component Value Date    WBC 6.07 10/01/2012    HGB 13.3 (L) 10/01/2012    HCT 39.5 (L) 10/01/2012     10/01/2012     Lab Results   Component Value Date    TACROLIMUS 6.4 10/01/2012       Assessment/Plan:   The patient is now 8 years status post orthotopic liver transplantation for Laennec's cirrhosis. Current recommendations:  1. S/p liver transplant: Excellent liver function. On Prograf and cellcept. Since he has some mild CKD he should continue prograf target to 3-4.   2. Colorectal cancer screening: repeat in 2027  3. Post-transplant health maintenance: pt to see dermatologist now and annually to screen for skin cancer.  5. R/O osteoporosis, hx of osteopenia in 2012: I am recommending a bone density to r/o osteoporosis unless one has been done in the last 2 years. Continue calcium and vit d    RTC in one year.     Bisi Avila MD           [facility] Patient Status  Functional Status: 100% - Normal, no complaints, no evidence of disease  Physical Capacity: No Limitations. .

## 2019-07-29 NOTE — PATIENT INSTRUCTIONS
1. Repeat colonoscopy 2027  2. See dermatologist now and annually  3. I will have Rosa check your bone density- continue calcium supplement with vit D  Return 1 year

## 2019-07-29 NOTE — LETTER
July 29, 2019                     [place] - Liver Transplant  [address] [facility] [address]  Phone: [phone]  Fax: [phone]   Patient: Neto Mixon   MR Number: [id number]   YOB: 1960   Date of Visit: 7/29/2019       Dear      Thank you for referring Neto Mixon to me for evaluation. Attached you will find relevant portions of my assessment and plan of care.    If you have questions, please do not hesitate to call me. I look forward to following Neto Mixon along with you.    Sincerely,    Bisi Avila MD    Enclosure    If you would like to receive this communication electronically, please contact [email or web address] or [phone] to request [employer] access.    [employer] is a tool which provides read-only access to select patient information with whom you have a relationship. Its easy to use and provides real time access to review your patients record including encounter summaries, notes, results, and demographic information.    If you feel you have received this communication in error or would no longer like to receive these types of communications, please e-mail [email or web address]

## 2019-08-01 DIAGNOSIS — Z94.4 LIVER TRANSPLANTED: Primary | ICD-10-CM

## 2019-08-26 ENCOUNTER — TELEPHONE (OUTPATIENT)
Dept: TRANSPLANT | Facility: CLINIC | Age: 59
End: 2019-08-26

## 2019-08-26 NOTE — TELEPHONE ENCOUNTER
Returned patient phone call - patient is requesting to have bone mineral density closer to his home.  He has requested orders to be mailed to him.

## 2019-08-26 NOTE — TELEPHONE ENCOUNTER
----- Message from Tiesha Winston sent at 8/26/2019 10:16 AM CDT -----  Contact: pt#[phone]  Pt is calling to see if he can get bone density test done in [place]. Please call

## 2019-11-11 ENCOUNTER — TELEPHONE (OUTPATIENT)
Dept: TRANSPLANT | Facility: CLINIC | Age: 59
End: 2019-11-11

## 2019-11-11 NOTE — TELEPHONE ENCOUNTER
----- Message from Betito Riggs sent at 2019  8:06 AM CST -----  Contact: Nilam knox/ [facility] @ [phone]  Nilam states pt is there now to complete labs but orders are , pls put new orders in asap. Pls fax to

## 2019-11-11 NOTE — TELEPHONE ENCOUNTER
----- Message from Maureen Montana sent at 2019 10:00 AM CST -----  Contact: nilam   Contact: Nilam knox/ [facility] @ [phone]  Nilam states pt is there now to complete labs but orders are , pls put new orders in asap. Pls fax to     Thank You   LUCIA Montana

## 2019-11-12 LAB
EXT ALBUMIN: 4.5
EXT ALKALINE PHOSPHATASE: 78
EXT ALT: 13
EXT AST: 23
EXT BASOPHIL%: 0.8
EXT BILIRUBIN TOTAL: 0.99
EXT BUN: 14.2
EXT CALCIUM: 9.7
EXT CHLORIDE: 104
EXT CMV DNA QUANT. BY PCR: ABNORMAL
EXT CO2: 29
EXT CREATININE: 1.41 MG/DL
EXT EOSINOPHIL%: 2.6
EXT GFR MDRD NON AF AMER: 51.63
EXT GLUCOSE: 95
EXT HEMATOCRIT: 43.6
EXT HEMOGLOBIN: 14.1
EXT HEP B S AG: ABNORMAL
EXT INR: 0.99
EXT LYMPH%: 22.2
EXT MONOCYTES%: 9
EXT PLATELETS: 162
EXT POTASSIUM: 4.2
EXT PROTEIN TOTAL: 7.5
EXT PT: 10.8
EXT SEGS%: 65.2
EXT SODIUM: 144 MMOL/L
EXT TACROLIMUS LVL: 5.4
EXT VIT D 25 HYDROXY: 34
EXT WBC: 4.91

## 2019-11-26 ENCOUNTER — TELEPHONE (OUTPATIENT)
Dept: TRANSPLANT | Facility: CLINIC | Age: 59
End: 2019-11-26

## 2019-11-26 NOTE — TELEPHONE ENCOUNTER
----- Message from Biis Avila MD sent at 11/24/2019 10:09 PM CST -----  The Labs are stable - please let patient know.

## 2019-11-26 NOTE — TELEPHONE ENCOUNTER
Labs reviewed by Dr. Avila  Continue routine labs no changes needed.  Letter sent for next lab appointment 02/17/20

## 2020-01-06 DIAGNOSIS — Z94.4 LIVER REPLACED BY TRANSPLANT: ICD-10-CM

## 2020-01-07 RX ORDER — TACROLIMUS 1 MG/1
CAPSULE ORAL
Qty: 630 CAPSULE | Refills: 0 | Status: SHIPPED | OUTPATIENT
Start: 2020-01-07 | End: 2020-06-06 | Stop reason: SDUPTHER

## 2020-01-08 DIAGNOSIS — Z94.4 LIVER REPLACED BY TRANSPLANT: Primary | ICD-10-CM

## 2020-01-08 RX ORDER — MYCOPHENOLATE MOFETIL 250 MG/1
CAPSULE ORAL
Qty: 360 CAPSULE | Refills: 2 | Status: SHIPPED | OUTPATIENT
Start: 2020-01-08 | End: 2020-01-08 | Stop reason: SDUPTHER

## 2020-01-08 RX ORDER — MYCOPHENOLATE MOFETIL 250 MG/1
500 CAPSULE ORAL 2 TIMES DAILY
Qty: 360 CAPSULE | Refills: 3 | Status: SHIPPED | OUTPATIENT
Start: 2020-01-08 | End: 2020-11-02

## 2020-01-08 NOTE — TELEPHONE ENCOUNTER
----- Message from Albina Palomares MA sent at 1/8/2020 10:28 AM CST -----  Contact: Bisi with [facility]      ----- Message -----  From: Michael Nino  Sent: 1/8/2020  10:19 AM CST  To: Margarita Mathews Staff    Bisi knox/ [facility] need refill on mycophenolate (CELLCEPT) 250 mg Cap.      [facility] - [facility]  [facility] [place] [address]  Phone: [phone] Fax: [phone]

## 2020-02-20 ENCOUNTER — TELEPHONE (OUTPATIENT)
Dept: TRANSPLANT | Facility: CLINIC | Age: 60
End: 2020-02-20

## 2020-02-20 LAB
EXT ALBUMIN: 4.1
EXT ALKALINE PHOSPHATASE: 74
EXT ALT: 12
EXT AST: 17
EXT BASOPHIL%: 0.8
EXT BILIRUBIN TOTAL: 0.55
EXT BUN: 14
EXT CALCIUM: 9.3
EXT CHLORIDE: 105
EXT CO2: 27
EXT CREATININE: 1.34 MG/DL
EXT EOSINOPHIL%: 2.1
EXT GFR MDRD NON AF AMER: 54.56
EXT GLUCOSE: 95
EXT HEMATOCRIT: 40.3
EXT HEMOGLOBIN: 13.2
EXT LYMPH%: 20.7
EXT MONOCYTES%: 7.5
EXT PLATELETS: 154
EXT POTASSIUM: 4.6
EXT PROTEIN TOTAL: 7
EXT SEGS%: 68.7
EXT SODIUM: 144 MMOL/L
EXT TACROLIMUS LVL: 6.6
EXT WBC: 5.17

## 2020-02-20 NOTE — TELEPHONE ENCOUNTER
----- Message from Shane Nixon MD sent at 2/20/2020  9:54 AM CST -----  Results reviewed. Please advise labs are stable.

## 2020-02-20 NOTE — LETTER
February 20, 2020    Neto Oral  [address] [facility] [address]          Dear Neto Mixon:  MRN: [id number]    This is a follow up to your recent labs, your lab results were stable.  There are no medicine changes.  Please have your labs drawn again on 5/18/20.      If you cannot have your labs drawn on the scheduled date, it is your responsibility to call the transplant department to reschedule.  To reschedule or make an appointment, please as to speak to or leave a message for my assistant, Domitila Harper or Jesenia, at [phone].  When leaving a message for Domitila Harper Angela or myself, we ask that you leave a brief message regarding your request.    Sincerely,        Your Liver Transplant Coordinator    [facility]  [address] [place] [phone]

## 2020-02-20 NOTE — TELEPHONE ENCOUNTER
Dr. Nixon  reviewed your labs. Labs are stable and no medications changes. Please repeat labs on 3/9/20.

## 2020-06-01 LAB
EXT ALBUMIN: 4.3
EXT ALKALINE PHOSPHATASE: 79
EXT ALT: 8
EXT AST: 17
EXT BASOPHIL%: 0.9
EXT BILIRUBIN TOTAL: 0.65
EXT BUN: 13.8
EXT CALCIUM: 9.5
EXT CHLORIDE: 105
EXT CHOLESTEROL: 205
EXT CO2: 29
EXT CREATININE: 1.34 MG/DL
EXT EOSINOPHIL%: 1.5
EXT GFR MDRD NON AF AMER: 54.56
EXT GLUCOSE: 98
EXT HDL: 45
EXT HEMATOCRIT: 40
EXT HEMOGLOBIN: 12.8
EXT LDL CHOLESTEROL: 149.6
EXT LYMPH%: 19.6
EXT MONOCYTES%: 8.4
EXT PLATELETS: 163
EXT POTASSIUM: 4.4
EXT PROTEIN TOTAL: 7.2
EXT SEGS%: 69.4
EXT SODIUM: 142 MMOL/L
EXT TACROLIMUS LVL: 6
EXT TRIGLYCERIDES: 51
EXT WBC: 4.55

## 2020-06-02 ENCOUNTER — TELEPHONE (OUTPATIENT)
Dept: TRANSPLANT | Facility: CLINIC | Age: 60
End: 2020-06-02

## 2020-06-02 NOTE — LETTER
June 2, 2020    Neto Oral  [address] [place] [address]          Dear Neto Mixon:  MRN: [id number]    This is a follow up to your recent labs, your lab results were stable.  There are no medicine changes.  Please have your labs drawn again on 0914/20.      If you cannot have your labs drawn on the scheduled date, it is your responsibility to call the transplant department to reschedule.  To reschedule or make an appointment, please as to speak to or leave a message for my assistant, Domitila Harper or Jesenia, at [phone].  When leaving a message for Domitila Harper Angela or myself, we ask that you leave a brief message regarding your request.    Sincerely,    Shauna Dudley RN      Your Liver Transplant Coordinator    [facility]  [address] [place] [phone]

## 2020-06-02 NOTE — TELEPHONE ENCOUNTER
Labs reviewed by Dr. Nixon  Continue routine labs no changes needed.  Letter sent for next lab appointment 09/14/20

## 2020-06-02 NOTE — TELEPHONE ENCOUNTER
----- Message from Shane Nixon MD sent at 6/2/2020  8:29 AM CDT -----  Results reviewed. Please advise labs are stable.

## 2020-06-05 DIAGNOSIS — Z94.4 LIVER REPLACED BY TRANSPLANT: ICD-10-CM

## 2020-06-05 NOTE — TELEPHONE ENCOUNTER
----- Message from Albina Palomares MA sent at 6/5/2020  8:42 AM CDT -----  Contact: Beatriz knox/[facility]      ----- Message -----  From: Dexter Blakely  Sent: 6/5/2020   8:24 AM CDT  To: Margarita Mathwes Staff    Calling in regards to refill for prescription   tacrolimus (PROGRAF) 1 MG Cap     Call back: [phone]  [address]  Fax: [phone]

## 2020-06-06 RX ORDER — TACROLIMUS 1 MG/1
CAPSULE ORAL
Qty: 630 CAPSULE | Refills: 3
Start: 2020-06-06 | End: 2020-11-16

## 2020-06-12 NOTE — TELEPHONE ENCOUNTER
----- Message from Bisi Avila MD sent at 7/23/2019  1:36 PM CDT -----  The Labs are stable - please let patient know.   Tetracycline Counseling: Patient counseled regarding possible photosensitivity and increased risk for sunburn.  Patient instructed to avoid sunlight, if possible.  When exposed to sunlight, patients should wear protective clothing, sunglasses, and sunscreen.  The patient was instructed to call the office immediately if the following severe adverse effects occur:  hearing changes, easy bruising/bleeding, severe headache, or vision changes.  The patient verbalized understanding of the proper use and possible adverse effects of tetracycline.  All of the patient's questions and concerns were addressed. Patient understands to avoid pregnancy while on therapy due to potential birth defects.

## 2020-10-19 ENCOUNTER — TELEPHONE (OUTPATIENT)
Dept: TRANSPLANT | Facility: CLINIC | Age: 60
End: 2020-10-19

## 2020-10-19 NOTE — TELEPHONE ENCOUNTER
Pt was calling to confirm he went to lab. ----- Message from Yesy Palomares sent at 10/19/2020 10:07 AM CDT -----  Contact: pt  Pt calling to speak with coordinator regarding blood work       Call Back : [phone]

## 2020-11-02 DIAGNOSIS — Z94.4 LIVER REPLACED BY TRANSPLANT: ICD-10-CM

## 2020-11-02 RX ORDER — MYCOPHENOLATE MOFETIL 250 MG/1
CAPSULE ORAL
Qty: 360 CAPSULE | Refills: 0 | Status: SHIPPED | OUTPATIENT
Start: 2020-11-02 | End: 2021-05-03

## 2020-11-03 NOTE — TELEPHONE ENCOUNTER
Verbalized to pt I have results and waiting on one more result.----- Message from Pieter Maynard sent at 11/3/2020 12:06 PM CST -----  Regarding: Blood work  Contact: Pt  Speak with coordinator. Patient states he had all blood work performed yesterday as ordered.  If coordinator needs to call back to verify call back # [phone]

## 2020-11-09 LAB
EXT ALBUMIN: 4.4
EXT ALKALINE PHOSPHATASE: 91
EXT ALT: 12
EXT AST: 20
EXT BASOPHIL%: 0.6
EXT BILIRUBIN TOTAL: 0.86
EXT BUN: 12.3
EXT CALCIUM: 9.4
EXT CHLORIDE: 106
EXT CO2: 30
EXT CREATININE: 1.47 MG/DL
EXT EOSINOPHIL%: 2.5
EXT GLUCOSE: 94
EXT HEMATOCRIT: 41
EXT HEMOGLOBIN: 13.3
EXT LYMPH%: 20.4
EXT MONOCYTES%: 8.8
EXT PLATELETS: 166
EXT POTASSIUM: 4.9
EXT PROTEIN TOTAL: 7.1
EXT SEGS%: 67.5
EXT SODIUM: 144 MMOL/L
EXT TACROLIMUS LVL: 6.2
EXT WBC: 4.76

## 2020-11-10 ENCOUNTER — TELEPHONE (OUTPATIENT)
Dept: TRANSPLANT | Facility: CLINIC | Age: 60
End: 2020-11-10

## 2020-11-16 ENCOUNTER — TELEPHONE (OUTPATIENT)
Dept: TRANSPLANT | Facility: CLINIC | Age: 60
End: 2020-11-16

## 2020-11-16 DIAGNOSIS — Z94.4 LIVER REPLACED BY TRANSPLANT: ICD-10-CM

## 2020-11-16 RX ORDER — TACROLIMUS 1 MG/1
3 CAPSULE ORAL EVERY 12 HOURS
Qty: 540 CAPSULE | Refills: 3
Start: 2020-11-16 | End: 2021-02-17

## 2020-11-16 NOTE — TELEPHONE ENCOUNTER
----- Message from Bisi Avila MD sent at 11/9/2020  5:15 PM CST -----  Decrease prograf to 3/3 from 4/3 and repeat labs in 1 month - please let patient know.

## 2020-12-14 ENCOUNTER — TELEPHONE (OUTPATIENT)
Dept: TRANSPLANT | Facility: CLINIC | Age: 60
End: 2020-12-14

## 2020-12-14 NOTE — TELEPHONE ENCOUNTER
Pt was calling  to reschedule lab to next week. ----- Message from Jesenia Wallace MA sent at 12/14/2020  9:12 AM CST -----  Regarding: FW: Labs  Contact: Neto  Please read below.  \    Reminder in epic  ----- Message -----  From: Michael Nino  Sent: 12/14/2020   8:11 AM CST  To: [facility] Post-Liver Transplant Non-Clinical  Subject: Labs                                             Pt will not be able to do labs today. He will try to do them next Monday.      [phone]

## 2020-12-24 LAB
EXT ALBUMIN: 4.2
EXT ALKALINE PHOSPHATASE: 89
EXT ALT: 11
EXT AST: 21
EXT BASOPHIL%: 0.7
EXT BILIRUBIN TOTAL: 0.8
EXT BUN: 13.1
EXT CALCIUM: 9
EXT CHLORIDE: 106
EXT CHOLESTEROL: 221
EXT CO2: 28
EXT CREATININE: 1.37 MG/DL
EXT EOSINOPHIL%: 2.2
EXT GFR MDRD AF AMER: 53.18
EXT GLUCOSE: 86
EXT HDL: 47
EXT HEMATOCRIT: 42.4
EXT HEMOGLOBIN: 13.3
EXT LDL CHOLESTEROL: 161.8
EXT LYMPH%: 17.7
EXT MONOCYTES%: 7.2
EXT PLATELETS: 178
EXT POTASSIUM: 4.5
EXT PROTEIN TOTAL: 7
EXT SEGS%: 72
EXT SODIUM: 142 MMOL/L
EXT TACROLIMUS LVL: 3.8
EXT TRIGLYCERIDES: 61
EXT WBC: 5.42

## 2020-12-28 ENCOUNTER — TELEPHONE (OUTPATIENT)
Dept: TRANSPLANT | Facility: CLINIC | Age: 60
End: 2020-12-28

## 2020-12-28 NOTE — LETTER
December 28, 2020    Neto Oral  [address] [place] [address]          Dear Neto Mixon:  MRN: [id number]    This is a follow up to your recent labs, your lab results were stable.  There are no medicine changes.  Please have your labs drawn again on 3/15/21.      If you cannot have your labs drawn on the scheduled date, it is your responsibility to call the transplant department to reschedule.  Please call [phone] and ask to speak to Domitila Blakely for all scheduling requests.     Sincerely,        Your Liver Transplant Coordinator    [facility]  [address] [place] [phone]

## 2020-12-28 NOTE — TELEPHONE ENCOUNTER
Labs reviewed. No changes. Letter sent ----- Message from Bisi Avila MD sent at 12/26/2020  3:23 PM CST -----  The Labs are stable - please let patient know.

## 2021-01-04 ENCOUNTER — TELEPHONE (OUTPATIENT)
Dept: TRANSPLANT | Facility: CLINIC | Age: 61
End: 2021-01-04

## 2021-03-08 ENCOUNTER — TELEPHONE (OUTPATIENT)
Dept: TRANSPLANT | Facility: CLINIC | Age: 61
End: 2021-03-08

## 2021-03-19 ENCOUNTER — TELEPHONE (OUTPATIENT)
Dept: TRANSPLANT | Facility: CLINIC | Age: 61
End: 2021-03-19

## 2021-03-19 LAB
EXT ALBUMIN: 4.3
EXT ALKALINE PHOSPHATASE: 91
EXT ALT: 11
EXT AST: 19
EXT BASOPHIL%: 0.8
EXT BILIRUBIN TOTAL: 0.91
EXT BUN: 15.3
EXT CALCIUM: 9.4
EXT CHLORIDE: 105
EXT CHOLESTEROL: 203
EXT CO2: 31
EXT CREATININE: 1.3 MG/DL
EXT EOSINOPHIL%: 2.5
EXT GLUCOSE: 91
EXT HDL: 44
EXT HEMATOCRIT: 43.7
EXT HEMOGLOBIN: 13.7
EXT LDL CHOLESTEROL: 144
EXT LYMPH%: 19.7
EXT MONOCYTES%: 8.3
EXT PLATELETS: 182
EXT POTASSIUM: 4.3
EXT PROTEIN TOTAL: 7.1
EXT SEGS%: 68.5
EXT SODIUM: 142 MMOL/L
EXT TACROLIMUS LVL: 5.3
EXT TRIGLYCERIDES: 75
EXT WBC: 4.82

## 2021-06-29 LAB
EXT ALBUMIN: 4.4
EXT ALT: 10
EXT AST: 18
EXT BASOPHIL%: 1
EXT BILIRUBIN TOTAL: 0.7
EXT BUN: 14
EXT CALCIUM: 9.7
EXT CHLORIDE: 104
EXT CO2: 29
EXT CREATININE: 1.36 MG/DL
EXT EOSINOPHIL%: 2.3
EXT GFR MDRD AF AMER: 53.45
EXT GLUCOSE: 92
EXT HEMATOCRIT: 43.1
EXT HEMOGLOBIN: 13.9
EXT LYMPH%: 20.6
EXT MONOCYTES%: 7.4
EXT PLATELETS: 177
EXT POTASSIUM: 5.1
EXT PROTEIN TOTAL: 6.7
EXT SEGS%: 68.3
EXT SODIUM: 142 MMOL/L
EXT TACROLIMUS LVL: 2.7
EXT WBC: 5.25

## 2021-07-02 ENCOUNTER — TELEPHONE (OUTPATIENT)
Dept: TRANSPLANT | Facility: CLINIC | Age: 61
End: 2021-07-02

## 2021-07-19 ENCOUNTER — TELEPHONE (OUTPATIENT)
Dept: TRANSPLANT | Facility: CLINIC | Age: 61
End: 2021-07-19

## 2021-08-05 LAB
EXT ALBUMIN: 4.2
EXT ALKALINE PHOSPHATASE: 87
EXT ALT: 11
EXT AST: 19
EXT BASOPHIL%: 1.1
EXT BILIRUBIN TOTAL: 1.16
EXT BUN: 16.2
EXT CALCIUM: 9.4
EXT CHLORIDE: 106
EXT CHOLESTEROL: 198
EXT CO2: 28
EXT CREATININE: 1.3 MG/DL
EXT EOSINOPHIL%: 0.13
EXT GFR MDRD AF AMER: 56.31
EXT GLUCOSE: 98
EXT HDL: 45
EXT HEMATOCRIT: 40.9
EXT HEMOGLOBIN: 13.4
EXT LDL CHOLESTEROL: 142.2
EXT LYMPH%: 20
EXT MONOCYTES%: 10
EXT PLATELETS: 173
EXT POTASSIUM: 4.4
EXT PROTEIN TOTAL: 7
EXT SEGS%: 65.7
EXT SODIUM: 143 MMOL/L
EXT TACROLIMUS LVL: 4.2
EXT TRIGLYCERIDES: 54
EXT WBC: 4.7
LDL/HDL RATIO: 3.2

## 2021-08-10 ENCOUNTER — TELEPHONE (OUTPATIENT)
Dept: TRANSPLANT | Facility: CLINIC | Age: 61
End: 2021-08-10

## 2021-11-02 DIAGNOSIS — Z94.4 LIVER REPLACED BY TRANSPLANT: ICD-10-CM

## 2021-11-02 RX ORDER — TACROLIMUS 1 MG/1
CAPSULE ORAL
Qty: 630 CAPSULE | Refills: 3 | Status: SHIPPED | OUTPATIENT
Start: 2021-11-02 | End: 2022-06-01 | Stop reason: SDUPTHER

## 2021-11-12 LAB
EXT ALBUMIN: 4.3
EXT ALKALINE PHOSPHATASE: 90
EXT ALT: 20
EXT AST: 25
EXT BASOPHIL%: 1
EXT BILIRUBIN TOTAL: 1.06
EXT BUN: 13.7
EXT CALCIUM: 9.2
EXT CHLORIDE: 105
EXT CO2: 27
EXT CREATININE: 1.29 MG/DL
EXT EOSINOPHIL%: 3.4
EXT GFR MDRD AF AMER: 56.81
EXT GLUCOSE: 96
EXT HEMATOCRIT: 41
EXT HEMOGLOBIN: 13.2
EXT LYMPH%: 19
EXT MONOCYTES%: 10.2
EXT PLATELETS: 193
EXT POTASSIUM: 4.4
EXT PROTEIN TOTAL: 6.9
EXT SEGS%: 66.2
EXT SODIUM: 143 MMOL/L
EXT TACROLIMUS LVL: 4
EXT WBC: 5

## 2021-11-16 ENCOUNTER — TELEPHONE (OUTPATIENT)
Dept: TRANSPLANT | Facility: CLINIC | Age: 61
End: 2021-11-16
Payer: COMMERCIAL

## 2022-02-23 LAB
EXT ALBUMIN: 4.3
EXT ALKALINE PHOSPHATASE: 96
EXT ALT: 24
EXT AST: 25
EXT BASOPHIL%: 0.7
EXT BILIRUBIN TOTAL: 1.01
EXT BUN: 12.3
EXT CALCIUM: 9.4
EXT CHLORIDE: 105
EXT CO2: 29
EXT CREATININE: 1.22 MG/DL
EXT EOSINOPHIL%: 2.9
EXT GFR MDRD NON AF AMER: 60.39
EXT HEMATOCRIT: 42.9
EXT HEMOGLOBIN: 14.3
EXT LYMPH%: 18.7
EXT MONOCYTES%: 8
EXT PLATELETS: 176
EXT POTASSIUM: 4.1
EXT PROTEIN TOTAL: 7.1
EXT SEGS%: 69.3
EXT SODIUM: 141 MMOL/L
EXT TACROLIMUS LVL: 4.4
EXT WBC: 5.5

## 2022-02-28 ENCOUNTER — TELEPHONE (OUTPATIENT)
Dept: TRANSPLANT | Facility: CLINIC | Age: 62
End: 2022-02-28
Payer: COMMERCIAL

## 2022-02-28 NOTE — TELEPHONE ENCOUNTER
Labs are stable. No changes. ----- Message from Shane Nixon MD sent at 2/25/2022 10:59 AM CST -----  Results reviewed. No action.

## 2022-02-28 NOTE — LETTER
February 28, 2022    Neto Oral  [address] [place] [address]          Dear Neto Blasdeaux:  MRN: [id number]    This is a follow up to your recent labs, your lab results were stable.  There are no medicine changes.  Please have your labs drawn again on 5/23/22.      If you cannot have your labs drawn on the scheduled date, it is your responsibility to call the transplant department to reschedule.  Please call [phone] and ask to speak to Jesenia TOUSSAINT -  for all scheduling requests.     Sincerely,        Your Liver Transplant Coordinator    [facility]  [address] [place] [phone]

## 2022-04-07 LAB
EXT ALBUMIN: 4.5
EXT ALKALINE PHOSPHATASE: 93
EXT ALT: 25
EXT AST: 27
EXT BASOPHIL%: 0.9
EXT BILIRUBIN TOTAL: 1.18
EXT BUN: 11.7
EXT CALCIUM: 9.2
EXT CHLORIDE: 106
EXT CHOLESTEROL: 128
EXT CO2: 28
EXT CREATININE: 1.31 MG/DL
EXT EOSINOPHIL%: 1.8
EXT GLUCOSE: 93
EXT HDL: 50
EXT HEMATOCRIT: 42.6
EXT HEMOGLOBIN: 14.1
EXT LDL CHOLESTEROL: 64.2
EXT LYMPH%: 18.2
EXT MONOCYTES%: 8.6
EXT PLATELETS: 161
EXT POTASSIUM: 4.3
EXT PROTEIN TOTAL: 6.8
EXT SEGS%: 70.1
EXT SODIUM: 143 MMOL/L
EXT TACROLIMUS LVL: 4.9
EXT TRIGLYCERIDES: 69
EXT WBC: 5.48

## 2022-04-08 ENCOUNTER — OFFICE VISIT (OUTPATIENT)
Dept: TRANSPLANT | Facility: CLINIC | Age: 62
End: 2022-04-08
Payer: COMMERCIAL

## 2022-04-08 ENCOUNTER — TELEPHONE (OUTPATIENT)
Dept: TRANSPLANT | Facility: CLINIC | Age: 62
End: 2022-04-08

## 2022-04-08 VITALS
HEIGHT: 76 IN | TEMPERATURE: 98 F | DIASTOLIC BLOOD PRESSURE: 77 MMHG | OXYGEN SATURATION: 99 % | BODY MASS INDEX: 30.92 KG/M2 | RESPIRATION RATE: 19 BRPM | HEART RATE: 62 BPM | WEIGHT: 253.88 LBS | SYSTOLIC BLOOD PRESSURE: 123 MMHG

## 2022-04-08 DIAGNOSIS — Z29.89 PROPHYLACTIC IMMUNOTHERAPY: ICD-10-CM

## 2022-04-08 DIAGNOSIS — M85.80 OSTEOPENIA, UNSPECIFIED LOCATION: Primary | ICD-10-CM

## 2022-04-08 DIAGNOSIS — Z94.4 LIVER TRANSPLANTED: ICD-10-CM

## 2022-04-08 PROCEDURE — 3008F PR BODY MASS INDEX (BMI) DOCUMENTED: ICD-10-PCS | Mod: CPTII,S$GLB,, | Performed by: INTERNAL MEDICINE

## 2022-04-08 PROCEDURE — 3074F SYST BP LT 130 MM HG: CPT | Mod: CPTII,S$GLB,, | Performed by: INTERNAL MEDICINE

## 2022-04-08 PROCEDURE — 99214 OFFICE O/P EST MOD 30 MIN: CPT | Mod: S$GLB,,, | Performed by: INTERNAL MEDICINE

## 2022-04-08 PROCEDURE — 99214 PR OFFICE/OUTPT VISIT, EST, LEVL IV, 30-39 MIN: ICD-10-PCS | Mod: S$GLB,,, | Performed by: INTERNAL MEDICINE

## 2022-04-08 PROCEDURE — 99999 PR PBB SHADOW E&M-EST. PATIENT-LVL V: ICD-10-PCS | Mod: PBBFAC,,, | Performed by: INTERNAL MEDICINE

## 2022-04-08 PROCEDURE — 3008F BODY MASS INDEX DOCD: CPT | Mod: CPTII,S$GLB,, | Performed by: INTERNAL MEDICINE

## 2022-04-08 PROCEDURE — 1160F PR REVIEW ALL MEDS BY PRESCRIBER/CLIN PHARMACIST DOCUMENTED: ICD-10-PCS | Mod: CPTII,S$GLB,, | Performed by: INTERNAL MEDICINE

## 2022-04-08 PROCEDURE — 1160F RVW MEDS BY RX/DR IN RCRD: CPT | Mod: CPTII,S$GLB,, | Performed by: INTERNAL MEDICINE

## 2022-04-08 PROCEDURE — 1159F PR MEDICATION LIST DOCUMENTED IN MEDICAL RECORD: ICD-10-PCS | Mod: CPTII,S$GLB,, | Performed by: INTERNAL MEDICINE

## 2022-04-08 PROCEDURE — 3074F PR MOST RECENT SYSTOLIC BLOOD PRESSURE < 130 MM HG: ICD-10-PCS | Mod: CPTII,S$GLB,, | Performed by: INTERNAL MEDICINE

## 2022-04-08 PROCEDURE — 3078F DIAST BP <80 MM HG: CPT | Mod: CPTII,S$GLB,, | Performed by: INTERNAL MEDICINE

## 2022-04-08 PROCEDURE — 3078F PR MOST RECENT DIASTOLIC BLOOD PRESSURE < 80 MM HG: ICD-10-PCS | Mod: CPTII,S$GLB,, | Performed by: INTERNAL MEDICINE

## 2022-04-08 PROCEDURE — 99999 PR PBB SHADOW E&M-EST. PATIENT-LVL V: CPT | Mod: PBBFAC,,, | Performed by: INTERNAL MEDICINE

## 2022-04-08 PROCEDURE — 1159F MED LIST DOCD IN RCRD: CPT | Mod: CPTII,S$GLB,, | Performed by: INTERNAL MEDICINE

## 2022-04-08 RX ORDER — ATORVASTATIN CALCIUM 40 MG/1
40 TABLET, FILM COATED ORAL NIGHTLY
COMMUNITY
Start: 2022-01-09

## 2022-04-08 NOTE — Clinical Note
April 8, 2022                     [facility]  [address] [facility] [address] [address]  Phone: [phone]  Fax: [phone]   Patient: Neto Mixon   MR Number: [id number]   YOB: 1960   Date of Visit: 4/8/2022       Dear      Thank you for referring Neto Mixon to me for evaluation. Attached you will find relevant portions of my assessment and plan of care.    If you have questions, please do not hesitate to call me. I look forward to following Neto Mixon along with you.    Sincerely,    Bisi Avila MD    Enclosure    If you would like to receive this communication electronically, please contact [email or web address] or [phone] to request [employer] access.    [employer] is a tool which provides read-only access to select patient information with whom you have a relationship. Its easy to use and provides real time access to review your patients record including encounter summaries, notes, results, and demographic information.    If you feel you have received this communication in error or would no longer like to receive these types of communications, please e-mail [email or web address]

## 2022-04-08 NOTE — PROGRESS NOTES
Transplant Hepatology  Liver Transplant Recipient Follow-up    Transplant Date: 2011  UNOS Native Liver Dx: Alcoholic Cirrhosis    Neto is here for follow up of his liver transplant.    ORGAN: LIVER  Whole or Partial: whole liver  Donor Type:  - brain death  CDC High Risk: no  Donor CMV Status: negative  Donor HCV Status: negative  Donor HBcAb: negative  Biliary Anastomosis:  Arterial Anatomy:  IVC reconstruction:  Portal vein status:    He has had the following complications since transplant: none.     Subjective:     Interval History: Neto is now 10 years and 8 months post liver transplant. Currently, he is doing well. Current complaints include none.     He is on Prograf with a target trough of 3-4 and also on cellcept 500 mg bid.  Allograft function 22: ALT 25, AST 27. CKD, cr 1.30. PG level 4.9.    Health Maintenance  Colonoscopy 2017: no polyps.  Bone density : osteopenia.  Dermatology: [place]    US doppler 3/12/18: satisfactory.     Review of Systems   Constitutional: Negative.    HENT: Negative.    Eyes: Negative.    Respiratory: Negative.    Cardiovascular: Negative.    Gastrointestinal: Negative.    Genitourinary: Negative.    Musculoskeletal: Negative.    Skin: Negative.    Neurological: Negative.    Psychiatric/Behavioral: Negative.        Objective:     Vitals:    22 0854   BP: 123/77   Pulse: 62   Resp: 19   Temp: 98.4 °F (36.9 °C)     Physical Exam  Vitals reviewed.   Constitutional:       Appearance: He is well-developed.   HENT:      Head: Normocephalic and atraumatic.   Eyes:      General: No scleral icterus.     Conjunctiva/sclera: Conjunctivae normal.      Pupils: Pupils are equal, round, and reactive to light.   Neck:      Thyroid: No thyromegaly.   Cardiovascular:      Rate and Rhythm: Normal rate and regular rhythm.      Heart sounds: Normal heart sounds.   Pulmonary:      Effort: Pulmonary effort is normal.      Breath sounds: Normal breath sounds. No rales.    Abdominal:      General: Bowel sounds are normal. There is no distension.      Palpations: Abdomen is soft. There is no mass.      Tenderness: There is no abdominal tenderness.   Musculoskeletal:         General: Normal range of motion.      Cervical back: Normal range of motion and neck supple.   Skin:     General: Skin is warm and dry.      Findings: No rash.   Neurological:      Mental Status: He is alert and oriented to person, place, and time.       Lab Results   Component Value Date    BILITOT 1.3 (H) 10/01/2012    AST 15 10/01/2012    ALT 11 10/01/2012    ALKPHOS 136 (H) 10/01/2012    CREATININE 1.0 10/01/2012    CREATININE 0.9 10/01/2012    ALBUMIN 3.9 10/01/2012    ALBUMIN 3.9 10/01/2012     Lab Results   Component Value Date    WBC 6.07 10/01/2012    HGB 13.3 (L) 10/01/2012    HCT 39.5 (L) 10/01/2012     10/01/2012     Lab Results   Component Value Date    TACROLIMUS 6.4 10/01/2012       Assessment/Plan:   The patient is now 10 years and 8 months status post orthotopic liver transplantation for Laennec's cirrhosis. Current recommendations:  1. S/p liver transplant: Excellent liver function. On Prograf and cellcept. Since he has some mild CKD he should continue prograf target to 3-4.   2. Colorectal cancer screening: repeat in 2027  3. Post-transplant health maintenance: pt to see dermatologist now and annually to screen for skin cancer.  5. R/O osteoporosis, hx of osteopenia in 2012: I am recommending a bone density to r/o osteoporosis     RTC in one year.     Bisi Avila MD           [facility] Patient Status  Functional Status: 100% - Normal, no complaints, no evidence of disease  Physical Capacity: No Limitations. .

## 2022-04-08 NOTE — TELEPHONE ENCOUNTER
A 1 year virtual recall was entered today, Dr. Avila states that he van do a virtual next and then in 2 years come in

## 2022-04-20 ENCOUNTER — TELEPHONE (OUTPATIENT)
Dept: TRANSPLANT | Facility: CLINIC | Age: 62
End: 2022-04-20
Payer: COMMERCIAL

## 2022-04-20 NOTE — TELEPHONE ENCOUNTER
Continue routine labs no changes needed.  Letter sent for next lab appointment 06/27/22      ----- Message from Shane Nixon MD sent at 4/8/2022 10:59 AM CDT -----  Results reviewed. No action.

## 2022-05-25 DIAGNOSIS — Z94.4 LIVER REPLACED BY TRANSPLANT: ICD-10-CM

## 2022-05-25 RX ORDER — MYCOPHENOLATE MOFETIL 250 MG/1
500 CAPSULE ORAL 2 TIMES DAILY
Qty: 120 CAPSULE | Refills: 11 | Status: SHIPPED | OUTPATIENT
Start: 2022-05-25 | End: 2022-06-01 | Stop reason: SDUPTHER

## 2022-05-26 ENCOUNTER — TELEPHONE (OUTPATIENT)
Dept: TRANSPLANT | Facility: CLINIC | Age: 62
End: 2022-05-26
Payer: COMMERCIAL

## 2022-06-01 DIAGNOSIS — Z94.4 LIVER REPLACED BY TRANSPLANT: ICD-10-CM

## 2022-06-01 RX ORDER — MYCOPHENOLATE MOFETIL 250 MG/1
500 CAPSULE ORAL 2 TIMES DAILY
Qty: 360 CAPSULE | Refills: 3 | Status: SHIPPED | OUTPATIENT
Start: 2022-06-01 | End: 2023-06-16

## 2022-06-01 RX ORDER — TACROLIMUS 1 MG/1
CAPSULE ORAL
Qty: 630 CAPSULE | Refills: 3 | Status: SHIPPED | OUTPATIENT
Start: 2022-06-01 | End: 2023-06-16

## 2022-07-01 ENCOUNTER — TELEPHONE (OUTPATIENT)
Dept: TRANSPLANT | Facility: CLINIC | Age: 62
End: 2022-07-01
Payer: COMMERCIAL

## 2022-07-14 LAB
EXT ALBUMIN: 4.4
EXT ALKALINE PHOSPHATASE: 90
EXT ALT: 23
EXT AST: 25
EXT BASOPHIL%: 0.7
EXT BILIRUBIN TOTAL: 0.92
EXT BUN: 11.3
EXT CALCIUM: 9.2
EXT CHLORIDE: 105
EXT CO2: 30
EXT CREATININE: 1.31 MG/DL
EXT EOSINOPHIL%: 2.7
EXT GLUCOSE: 96
EXT HEMATOCRIT: 43.2
EXT HEMOGLOBIN: 14.4
EXT LYMPH%: 19
EXT MONOCYTES%: 8.1
EXT PLATELETS: 160
EXT POTASSIUM: 4.4
EXT PROTEIN TOTAL: 7
EXT SEGS%: 69.1
EXT SODIUM: 144 MMOL/L
EXT TACROLIMUS LVL: 5
EXT WBC: 5.54

## 2022-07-15 ENCOUNTER — TELEPHONE (OUTPATIENT)
Dept: TRANSPLANT | Facility: CLINIC | Age: 62
End: 2022-07-15
Payer: COMMERCIAL

## 2022-07-15 NOTE — LETTER
July 15, 2022    Neto Oral  [address] [facility] [address]          Dear Neto Blasdeaux:  MRN: [id number]    This is a follow up to your recent labs, your lab results were stable.  There are no medicine changes.  Our protocol has changed, you will now have labs every 6 months.  Please have your labs drawn again on 12/26/22.      If you cannot have your labs drawn on the scheduled date, it is your responsibility to call the transplant department to reschedule.  Please call [phone] and ask to speak to Jesenia TOUSSAINT   for all scheduling requests.     Sincerely,      Fady DÍAZN, RN  Your Liver Transplant Coordinator    [facility]  [address] [place] [phone]

## 2022-07-15 NOTE — TELEPHONE ENCOUNTER
Stable labs, no medication changes.  Next labs 12/26/22, letter sent.  ----- Message from Shane Nixon MD sent at 7/15/2022 11:50 AM CDT -----  Results reviewed. No action.

## 2022-12-19 ENCOUNTER — TELEPHONE (OUTPATIENT)
Dept: TRANSPLANT | Facility: CLINIC | Age: 62
End: 2022-12-19
Payer: COMMERCIAL

## 2022-12-19 NOTE — TELEPHONE ENCOUNTER
Labs drawn today, updated lab order to be sent.  ----- Message from Michael Matthews MA sent at 2022  8:47 AM CST -----  Regarding: FW: Order    ----- Message -----  From: José Antonio Fine  Sent: 2022   8:39 AM CST  To: Tiffani Lynn Staff  Subject: Order                                            Kristin called from [facility] stating that pt have standing orders to be drawn every 6 months but they are . She is requesting new orders for pt       Contact [phone]  Fax [phone]

## 2022-12-20 ENCOUNTER — TELEPHONE (OUTPATIENT)
Dept: TRANSPLANT | Facility: CLINIC | Age: 62
End: 2022-12-20
Payer: COMMERCIAL

## 2022-12-20 NOTE — TELEPHONE ENCOUNTER
Labs not resulted and received.  Will not send updated lab order until date is known.  Called to notify Pathology lab, verbalized understanding.  ----- Message from Lisa Cash sent at 12/20/2022  9:11 AM CST -----  Regarding: requesting lab orders  Contact: [facility] called requesting the updated lab orders placed be faxed to this FAX # [phone]

## 2022-12-21 ENCOUNTER — TELEPHONE (OUTPATIENT)
Dept: TRANSPLANT | Facility: CLINIC | Age: 62
End: 2022-12-21
Payer: COMMERCIAL

## 2022-12-22 LAB
EXT ALBUMIN: 4.2
EXT ALKALINE PHOSPHATASE: 88
EXT ALT: 12
EXT AST: 17
EXT BASOPHIL%: 0.7
EXT BILIRUBIN TOTAL: 0.79
EXT BUN: 15.9
EXT CALCIUM: 9.4
EXT CHLORIDE: 102
EXT CHOLESTEROL: 246
EXT CO2: 28
EXT CREATININE: 1.32 MG/DL
EXT EOSINOPHIL%: 1.6
EXT GLUCOSE: 98
EXT HDL: 50
EXT HEMATOCRIT: 42.8
EXT HEMOGLOBIN: 14.4
EXT LDL CHOLESTEROL: 183.8
EXT LYMPH%: 23
EXT MONOCYTES%: 8.9
EXT PLATELETS: 164
EXT POTASSIUM: 4.2
EXT PROTEIN TOTAL: 7.2
EXT SEGS%: 65.6
EXT SODIUM: 140 MMOL/L
EXT TACROLIMUS LVL: 5.8
EXT TRIGLYCERIDES: 61
EXT WBC: 5.51

## 2022-12-27 ENCOUNTER — TELEPHONE (OUTPATIENT)
Dept: TRANSPLANT | Facility: CLINIC | Age: 62
End: 2022-12-27
Payer: COMMERCIAL

## 2022-12-27 NOTE — TELEPHONE ENCOUNTER
Labs reviewed and are stable.  Patient to repeat labs on 6/26/23.   Letter sent to patient.      ----- Message from Shane Nixon MD sent at 12/23/2022  5:42 PM CST -----  Results reviewed. No action.

## 2022-12-27 NOTE — LETTER
December 27, 2022    Neto Oral  [address] [place] [address]          Dear Neto Blasdeaux:  MRN: [id number]    This is a follow up to your recent labs, your lab results were stable.  There are no medicine changes.  Please have your labs drawn again on 6/26/23.      If you cannot have your labs drawn on the scheduled date, it is your responsibility to call the transplant department to reschedule.  Please call [phone] and ask to speak to Jesenia TOUSSAINT -  for all scheduling requests.     Sincerely,        Your Liver Transplant Coordinator    [facility]  [address] [place] [phone]

## 2023-01-04 ENCOUNTER — TELEPHONE (OUTPATIENT)
Dept: TRANSPLANT | Facility: CLINIC | Age: 63
End: 2023-01-04
Payer: COMMERCIAL

## 2023-01-04 NOTE — TELEPHONE ENCOUNTER
Next labs 6/26.  ----- Message from Bisi Avila MD sent at 1/3/2023  4:56 PM CST -----  Labs are ok  ----- Message -----  From: Jackie Barbosa RN  Sent: 1/3/2023   9:54 AM CST  To: Bisi Avila MD, Fady Salazar RN      ----- Message -----  From: Shane Nixon MD  Sent: 12/30/2022   2:14 PM CST  To: [facility] Post-Liver Transplant Clinical    I don't think I have seen this bridget in over a decade. Who is following him?

## 2023-01-11 ENCOUNTER — EPISODE CHANGES (OUTPATIENT)
Dept: TRANSPLANT | Facility: CLINIC | Age: 63
End: 2023-01-11

## 2023-06-14 DIAGNOSIS — Z94.4 LIVER REPLACED BY TRANSPLANT: ICD-10-CM

## 2023-06-16 RX ORDER — MYCOPHENOLATE MOFETIL 250 MG/1
CAPSULE ORAL
Qty: 360 CAPSULE | Refills: 3 | Status: SHIPPED | OUTPATIENT
Start: 2023-06-16

## 2023-06-16 RX ORDER — TACROLIMUS 1 MG/1
CAPSULE ORAL
Qty: 630 CAPSULE | Refills: 1 | Status: SHIPPED | OUTPATIENT
Start: 2023-06-16 | End: 2023-12-29

## 2023-08-16 ENCOUNTER — TELEPHONE (OUTPATIENT)
Dept: TRANSPLANT | Facility: CLINIC | Age: 63
End: 2023-08-16
Payer: COMMERCIAL

## 2023-08-23 LAB
EXT ALBUMIN: 4.4
EXT ALKALINE PHOSPHATASE: 90
EXT ALT: 13
EXT AST: 19
EXT BASOPHIL%: 0.7
EXT BILIRUBIN TOTAL: 1.29
EXT BUN: 14.3
EXT CALCIUM: 9.5
EXT CHLORIDE: 108
EXT CHOLESTEROL: 126
EXT CO2: 28
EXT CREATININE: 1.3 MG/DL
EXT EGFR NO RACE VARIABLE: 62.11
EXT EOSINOPHIL%: 2.3
EXT GLUCOSE: 93
EXT HDL: 52
EXT HEMATOCRIT: 41.8
EXT HEMOGLOBIN: 14.1
EXT LDL CHOLESTEROL: 62.4
EXT LYMPH%: 21.3
EXT MONOCYTES%: 8.5
EXT PLATELETS: 156
EXT POTASSIUM: 4.9
EXT PROTEIN TOTAL: 7
EXT SEGS%: 67
EXT SODIUM: 145 MMOL/L
EXT TACROLIMUS LVL: 3.6
EXT TRIGLYCERIDES: 58
EXT WBC: 5.68

## 2023-08-30 ENCOUNTER — TELEPHONE (OUTPATIENT)
Dept: TRANSPLANT | Facility: CLINIC | Age: 63
End: 2023-08-30
Payer: COMMERCIAL

## 2023-08-30 NOTE — TELEPHONE ENCOUNTER
Labs reviewed and are stable.  Patient to repeat labs on 2/19/24.   Letter sent to patient.      ----- Message from Bisi Avila MD sent at 8/29/2023  4:58 PM CDT -----  The Labs are stable - please let patient know.

## 2023-08-30 NOTE — LETTER
August 30, 2023    Neto Oral  [address] [place] [address]          Dear Neto Ramosbodeaux:  MRN: [id number]    This is a follow up to your recent labs, your lab results were stable.  There are no medicine changes.  Please have your labs drawn again on 2/19/2024.  You are OVERDUE for your yearly follow up visit with Dr. Avila.  This appointment will be scheduled for you.    If you cannot have your labs drawn or cannot make your appointment on the scheduled date, it is your responsibility to call the transplant department to reschedule.  Please call [phone] and ask to speak to [facility]  for all scheduling requests.     Sincerely,        Your Liver Transplant Coordinator    [facility]  [address] [place] [phone]

## 2023-09-01 ENCOUNTER — TELEPHONE (OUTPATIENT)
Dept: TRANSPLANT | Facility: CLINIC | Age: 63
End: 2023-09-01
Payer: COMMERCIAL

## 2023-12-29 DIAGNOSIS — Z94.4 LIVER REPLACED BY TRANSPLANT: ICD-10-CM

## 2023-12-29 RX ORDER — TACROLIMUS 1 MG/1
CAPSULE ORAL
Qty: 210 CAPSULE | Refills: 1 | Status: SHIPPED | OUTPATIENT
Start: 2023-12-29 | End: 2024-02-20

## 2024-02-16 DIAGNOSIS — Z94.4 LIVER REPLACED BY TRANSPLANT: ICD-10-CM

## 2024-02-20 RX ORDER — TACROLIMUS 1 MG/1
CAPSULE ORAL
Qty: 210 CAPSULE | Refills: 11 | Status: SHIPPED | OUTPATIENT
Start: 2024-02-20

## 2024-03-05 LAB
EXT ALBUMIN: 4.3
EXT ALKALINE PHOSPHATASE: 96
EXT ALT: 11
EXT AST: 18
EXT BASOPHIL%: 0.8
EXT BILIRUBIN TOTAL: 1.05
EXT BUN: 12.2
EXT CALCIUM: 9.7
EXT CHLORIDE: 105
EXT CHOLESTEROL: 231
EXT CO2: 27
EXT CREATININE: 1.34 MG/DL
EXT EGFR NO RACE VARIABLE: 59.52
EXT EOSINOPHIL%: 1.7
EXT GLUCOSE: 97
EXT HDL: 51
EXT HEMATOCRIT: 43
EXT HEMOGLOBIN: 14.4
EXT LDL CHOLESTEROL: 167.8
EXT LYMPH%: 18.8
EXT MONOCYTES%: 8.1
EXT PLATELETS: 173
EXT POTASSIUM: 4.6
EXT PROTEIN TOTAL: 7.4
EXT SEGS%: 70.4
EXT SODIUM: 143 MMOL/L
EXT TACROLIMUS LVL: 3.9
EXT TRIGLYCERIDES: 61
EXT WBC: 5.9

## 2024-03-07 ENCOUNTER — TELEPHONE (OUTPATIENT)
Dept: TRANSPLANT | Facility: CLINIC | Age: 64
End: 2024-03-07
Payer: COMMERCIAL

## 2024-03-07 NOTE — TELEPHONE ENCOUNTER
Labs reviewed and are stable.  Patient to repeat labs on 9/3/24   Letter sent to patient.      ----- Message from Bisi Avila MD sent at 3/6/2024  4:31 PM CST -----  The Labs are stable - please let patient know.

## 2024-03-07 NOTE — LETTER
March 7, 2024    Neto Oral  [address] [place] [address]          Dear Neto Blasdeaux:  MRN: [id number]    This is a follow up to your recent labs, your lab results were stable.  There are no medicine changes.  Please have your labs drawn again on 9/3/24.      If you cannot have your labs drawn on the scheduled date, it is your responsibility to call the transplant department to reschedule.  Please call [phone] and ask to speak to [facility]  for all scheduling requests.     Sincerely,      Bobbi Skelton RN  Your Liver Transplant Coordinator    [facility]  [address] [place] [id number]  [phone]       
2 person assist

## 2024-04-15 DIAGNOSIS — Z94.4 LIVER REPLACED BY TRANSPLANT: ICD-10-CM

## 2024-04-16 RX ORDER — MYCOPHENOLATE MOFETIL 250 MG/1
500 CAPSULE ORAL 2 TIMES DAILY
Qty: 360 CAPSULE | Refills: 3 | Status: SHIPPED | OUTPATIENT
Start: 2024-04-16

## 2024-09-09 ENCOUNTER — TELEPHONE (OUTPATIENT)
Dept: TRANSPLANT | Facility: CLINIC | Age: 64
End: 2024-09-09
Payer: COMMERCIAL

## 2024-09-20 LAB
EXT ABO: NORMAL
EXT AFP: NORMAL
EXT ALBUMIN: 4.2
EXT ALCOHOL, MEDICAL, SERUM: NORMAL
EXT ALKALINE PHOSPHATASE: 85
EXT ALT: 10
EXT AMMONIA LEVEL: NORMAL
EXT AMYLASE: NORMAL
EXT AST: 18
EXT BACTERIA UA: NORMAL
EXT BASOPHIL%: 1
EXT BILIRUBIN DIRECT: NORMAL
EXT BILIRUBIN TOTAL: 0.87
EXT BK VIRUS DNA QN PCR: NORMAL
EXT BK VIRUS DNA QUANT, PCR, URINE: NORMAL
EXT BLOOD (STOOL): NORMAL
EXT BLOOD CULTURE, ROUTINE: NORMAL
EXT BLOOD CULTURE: NORMAL
EXT BLOOD FUNGUS: NORMAL
EXT BLOOD WITH ANAEROBE: NORMAL
EXT BUN: 10.8
EXT CALCIUM: 9.6
EXT CDIFF (STOOL): NORMAL
EXT CHLORIDE: 106
EXT CHOLESTEROL: 222
EXT CMV ANTIGENEMIA: NORMAL
EXT CMV DNA QUANT. BY PCR: NORMAL
EXT CO2: 27
EXT CREATININE UA: NORMAL
EXT CREATININE: 1.25 MG/DL
EXT CULTURE (STOOL): NORMAL
EXT CULTURE, BODY FLUID - BACTEC: NORMAL
EXT CYCLOSPORONE LEVEL: NORMAL
EXT DRUGS OF ABUSE CONFIRMATION: NORMAL
EXT EBV DNA BY PCR: NORMAL
EXT EBV DNA-COPIES/ML: NORMAL
EXT EBV IGG: NORMAL
EXT EBV IGM: NORMAL
EXT EGFR NO RACE VARIABLE: NORMAL
EXT EOSINOPHIL%: 2
EXT ERYTHROPOIETIN: NORMAL
EXT FERRITIN: NORMAL
EXT FOLATE: NORMAL
EXT FUNGAL (STOOL): NORMAL
EXT G6PD QUAL: NORMAL
EXT GFR MDRD AF AMER: 64.7
EXT GFR MDRD NON AF AMER: NORMAL
EXT GGT: NORMAL
EXT GLUCOSE: 95
EXT HBV DNA QUANT PCR: NORMAL
EXT HBV DNA, QUALITATIVE PCR: NORMAL
EXT HCV QUANT: NORMAL
EXT HDL: 54
EXT HEMATOCRIT: 43
EXT HEMOGLOBIN A1C: NORMAL
EXT HEMOGLOBIN: 14.1
EXT HEP B S AB: NORMAL
EXT HEP B S AG: NORMAL
EXT HIV RNA QUANT PCR: NORMAL
EXT HIV: NORMAL
EXT IMMUNKNOW (NON-STIMULATED): NORMAL
EXT IMMUNKNOW (STIMULATED): NORMAL
EXT INR: NORMAL
EXT IRON SATURATION: NORMAL
EXT LDL CHOLESTEROL: 154.8
EXT LIPASE: NORMAL
EXT LYMPH%: 22.6
EXT MAGNESIUM: NORMAL
EXT MONOCYTES%: 10.4
EXT O&P (STOOL): NORMAL
EXT PHOSPHORUS: NORMAL
EXT PLATELETS: 153
EXT POTASSIUM: 4.6
EXT PREALBUMIN: NORMAL
EXT PROT/CREAT RATIO UR: NORMAL
EXT PROTEIN TOTAL: 7
EXT PROTEIN UA: NORMAL
EXT PT: NORMAL
EXT PTH, INTACT: NORMAL
EXT PTT: NORMAL
EXT RBC UA: NORMAL
EXT RETICULOCYTE COUNT: NORMAL
EXT SARS COV-2 (COVID-19): NORMAL
EXT SEGS%: 63.8
EXT SIROLIMUS LVL: NORMAL
EXT SODIUM: 143 MMOL/L
EXT TACROLIMUS LVL: 10.3
EXT TIBC: NORMAL
EXT TRIGLYCERIDES: 66
EXT UNSATURATED IRON BINDING CAP.: NORMAL
EXT URIC ACID: NORMAL
EXT VIT D 1 25 DIHYDROXY: NORMAL
EXT VIT D 25 HYDROXY: NORMAL
EXT WBC (STOOL): NORMAL
EXT WBC UA: NORMAL
EXT WBC: 4.91

## 2024-09-27 ENCOUNTER — TELEPHONE (OUTPATIENT)
Dept: TRANSPLANT | Facility: CLINIC | Age: 64
End: 2024-09-27
Payer: COMMERCIAL

## 2024-09-27 NOTE — LETTER
September 27, 2024    Neto Oral  [address] [place] [id number]          Dear Neto Mixon:  MRN: [id number]    This is a follow up to your recent labs, your lab results were stable.  There are no medicine changes.  Please have your labs drawn again on 3/10/25.      If you cannot have your labs drawn on the scheduled date, it is your responsibility to call the transplant department to reschedule.  Please call [phone] and ask to speak to Selam Reed for all scheduling requests.     Sincerely,      Bobbi Skelton RN  Your Liver Transplant Coordinator    [facility]  [address] [place] [address] [phone]

## 2024-09-27 NOTE — TELEPHONE ENCOUNTER
Labs reviewed and are stable.  Patient to repeat labs on 3/10/2025.   Letter sent to patient.        ----- Message from Bisi Avila MD sent at 9/24/2024  9:34 AM CDT -----  The Labs are stable. Repeat labs - I am not sure prograf is a true trough - please let patient know.

## 2024-09-27 NOTE — TELEPHONE ENCOUNTER
----- Message from Bisi Avila MD sent at 9/24/2024  9:34 AM CDT -----  The Labs are stable. Repeat labs - I am not sure prograf is a true trough - please let patient know.

## 2024-12-04 ENCOUNTER — TELEPHONE (OUTPATIENT)
Dept: TRANSPLANT | Facility: CLINIC | Age: 64
End: 2024-12-04
Payer: COMMERCIAL

## 2024-12-30 DIAGNOSIS — Z94.4 LIVER REPLACED BY TRANSPLANT: ICD-10-CM

## 2024-12-31 RX ORDER — TACROLIMUS 1 MG/1
CAPSULE ORAL
Qty: 210 CAPSULE | Refills: 7 | Status: SHIPPED | OUTPATIENT
Start: 2024-12-31

## 2025-03-11 LAB
EXT ABO: NORMAL
EXT AFP: NORMAL
EXT ALBUMIN: 4
EXT ALCOHOL, MEDICAL, SERUM: NORMAL
EXT ALKALINE PHOSPHATASE: 81
EXT ALT: 10
EXT AMMONIA LEVEL: NORMAL
EXT AMYLASE: NORMAL
EXT AST: 18
EXT BACTERIA UA: NORMAL
EXT BASOPHIL%: 1
EXT BILIRUBIN DIRECT: NORMAL
EXT BILIRUBIN TOTAL: 0.63
EXT BK VIRUS DNA QN PCR: NORMAL
EXT BK VIRUS DNA QUANT, PCR, URINE: NORMAL
EXT BLOOD (STOOL): NORMAL
EXT BLOOD CULTURE, ROUTINE: NORMAL
EXT BLOOD CULTURE: NORMAL
EXT BLOOD FUNGUS: NORMAL
EXT BLOOD WITH ANAEROBE: NORMAL
EXT BUN: 14.7
EXT CALCIUM: 9.4
EXT CDIFF (STOOL): NORMAL
EXT CHLORIDE: 102
EXT CHOLESTEROL: 199
EXT CMV ANTIGENEMIA: NORMAL
EXT CMV DNA QUANT. BY PCR: NORMAL
EXT CO2: 29
EXT CREATININE UA: NORMAL
EXT CREATININE: 1.24 MG/DL
EXT CULTURE (STOOL): NORMAL
EXT CULTURE, BODY FLUID - BACTEC: NORMAL
EXT CYCLOSPORONE LEVEL: NORMAL
EXT DRUGS OF ABUSE CONFIRMATION: NORMAL
EXT EBV DNA BY PCR: NORMAL
EXT EBV DNA-COPIES/ML: NORMAL
EXT EBV IGG: NORMAL
EXT EBV IGM: NORMAL
EXT EGFR NO RACE VARIABLE: 64.92
EXT EOSINOPHIL%: 1.1
EXT ERYTHROPOIETIN: NORMAL
EXT FERRITIN: NORMAL
EXT FOLATE: NORMAL
EXT FUNGAL (STOOL): NORMAL
EXT G6PD QUAL: NORMAL
EXT GFR MDRD AF AMER: NORMAL
EXT GFR MDRD NON AF AMER: NORMAL
EXT GGT: NORMAL
EXT GLUCOSE: 100
EXT HBV DNA QUANT PCR: NORMAL
EXT HBV DNA, QUALITATIVE PCR: NORMAL
EXT HCV QUANT: NORMAL
EXT HDL: 48
EXT HEMATOCRIT: 40.1
EXT HEMOGLOBIN A1C: NORMAL
EXT HEMOGLOBIN: 13.4
EXT HEP B S AB: NORMAL
EXT HEP B S AG: NORMAL
EXT HIV RNA QUANT PCR: NORMAL
EXT HIV: NORMAL
EXT IMMUNKNOW (NON-STIMULATED): NORMAL
EXT IMMUNKNOW (STIMULATED): NORMAL
EXT INR: NORMAL
EXT IRON SATURATION: NORMAL
EXT LDL CHOLESTEROL: 2.9
EXT LIPASE: NORMAL
EXT LYMPH%: 16.5
EXT MAGNESIUM: NORMAL
EXT MONOCYTES%: 8
EXT O&P (STOOL): NORMAL
EXT PHOSPHORUS: NORMAL
EXT PLATELETS: 165
EXT POTASSIUM: 4.4
EXT PREALBUMIN: NORMAL
EXT PROT/CREAT RATIO UR: NORMAL
EXT PROTEIN TOTAL: 7
EXT PROTEIN UA: NORMAL
EXT PT: NORMAL
EXT PTH, INTACT: NORMAL
EXT PTT: NORMAL
EXT RBC UA: NORMAL
EXT RETICULOCYTE COUNT: NORMAL
EXT SARS COV-2 (COVID-19): NORMAL
EXT SEGS%: 73
EXT SIROLIMUS LVL: NORMAL
EXT SODIUM: 139 MMOL/L
EXT TACROLIMUS LVL: NORMAL
EXT TIBC: NORMAL
EXT TRIGLYCERIDES: 49
EXT UNSATURATED IRON BINDING CAP.: NORMAL
EXT URIC ACID: NORMAL
EXT VIT D 1 25 DIHYDROXY: NORMAL
EXT VIT D 25 HYDROXY: NORMAL
EXT WBC (STOOL): NORMAL
EXT WBC UA: NORMAL
EXT WBC: 5.26

## 2025-03-24 ENCOUNTER — RESULTS FOLLOW-UP (OUTPATIENT)
Dept: TRANSPLANT | Facility: CLINIC | Age: 65
End: 2025-03-24
Payer: COMMERCIAL

## 2025-03-24 DIAGNOSIS — Z94.4 LIVER REPLACED BY TRANSPLANT: ICD-10-CM

## 2025-03-24 NOTE — TELEPHONE ENCOUNTER
Telephone call to patient for lab review.  Labs stable, will decrease prograf to 3/3. Will repeat labs 4/14/25 per protocol.  Pt repeated back instructions with verbalized understanding.        ----- Message from Bisi Avila MD sent at 3/24/2025 11:48 AM CDT -----  The Labs are stable; lower prograf to 3/3 from 4/3 and repeat labs in 1 month - please let patient know.  ----- Message -----  From: Bobbi Skelton RN  Sent: 3/20/2025   3:12 PM CDT  To: Bisi Avila MD

## 2025-03-24 NOTE — LETTER
March 24, 2025    Neto Ramosbodeaux  [address] [place] [id number]          Dear Neto Mixon:  MRN: [id number]    This is a follow up to your recent labs, your liver function test results were stable.   Your Prograf (Tacrolimus) level was higher than goal.  Therapeutic level for you is 3-5.  Your level was 6.8.  We reviewed this via telephone and you were instructed to decrease your Prograf dose to 3 mg every 12 hours.  Please have your labs drawn again on 4/14/25.      If you cannot have your labs drawn on the scheduled date, it is your responsibility to call the transplant department to reschedule.  Please call [phone] and ask to speak to [facility]  for all scheduling requests within the [facility]..     Sincerely,      Bobbi Skelton RN  Your Liver Transplant Coordinator    [facility]  [address] [place] [id number] [phone]

## 2025-03-25 RX ORDER — TACROLIMUS 1 MG/1
3 CAPSULE ORAL EVERY 12 HOURS
Qty: 540 CAPSULE | Refills: 3 | Status: SHIPPED | OUTPATIENT
Start: 2025-03-25

## 2025-03-26 DIAGNOSIS — Z94.4 LIVER REPLACED BY TRANSPLANT: ICD-10-CM

## 2025-03-27 RX ORDER — MYCOPHENOLATE MOFETIL 250 MG/1
500 CAPSULE ORAL 2 TIMES DAILY
Qty: 360 CAPSULE | Refills: 3 | Status: SHIPPED | OUTPATIENT
Start: 2025-03-27

## 2025-04-14 ENCOUNTER — TELEPHONE (OUTPATIENT)
Dept: TRANSPLANT | Facility: CLINIC | Age: 65
End: 2025-04-14
Payer: COMMERCIAL

## 2025-04-14 NOTE — TELEPHONE ENCOUNTER
----- Message from Sarah sent at 4/14/2025  8:51 AM CDT -----  Regarding: Orders  Contact: Pt  [phone]  Name of Caller:  Neto  Contact Preference:  [phone] of Call:  Requesting standing lab order for pathology lab in [place] Pt is currently waiting at the at

## 2025-04-15 LAB
EXT ALBUMIN: 4
EXT ALKALINE PHOSPHATASE: 82
EXT ALT: 8
EXT AST: 18
EXT BASOPHIL%: 0.8
EXT BILIRUBIN TOTAL: 1.02
EXT BUN: 11.6
EXT CALCIUM: 9.3
EXT CHLORIDE: 103
EXT CO2: 27
EXT CREATININE: 1.18 MG/DL
EXT EOSINOPHIL%: 1.7
EXT GFR MDRD AF AMER: 68.91
EXT GLUCOSE: 90
EXT HEMATOCRIT: 39.9
EXT HEMOGLOBIN: 13.3
EXT LYMPH%: 22
EXT MONOCYTES%: 9.9
EXT PLATELETS: 149
EXT POTASSIUM: 4.1
EXT PROTEIN TOTAL: 6.8
EXT SEGS%: 65.4
EXT SODIUM: 140 MMOL/L
EXT TACROLIMUS LVL: 4
EXT WBC: 4.77

## 2025-04-20 ENCOUNTER — RESULTS FOLLOW-UP (OUTPATIENT)
Dept: TRANSPLANT | Facility: CLINIC | Age: 65
End: 2025-04-20
Payer: COMMERCIAL

## 2025-04-20 NOTE — LETTER
April 24, 2025    Neto Ramosbodeaux  [address] [place] [id number]          Dear Neto Mixon:  MRN: [id number]    This is a follow up to your recent labs, your lab results were stable.  There are no medicine changes.  Please have your labs drawn again on 10/13/2025.      If you cannot have your labs drawn on the scheduled date, it is your responsibility to call the transplant department to reschedule.  Please call [phone] and ask to speak to [facility] for all scheduling requests.     Sincerely,      Bobbi Skelton RN  Your Liver Transplant Coordinator    [facility] [place]  [address] [place] [address] [phone]

## 2025-04-24 NOTE — TELEPHONE ENCOUNTER
Labs reviewed and are stable.  Patient to repeat labs on 10/13/25.   Letter sent to patient.          ----- Message from Bisi Avila MD sent at 4/20/2025  9:32 PM CDT -----  Labs stable  ----- Message -----  From: Geri Chatman MA  Sent: 4/15/2025  10:45 AM CDT  To: Bisi Avila MD